# Patient Record
Sex: MALE | Race: WHITE | Employment: FULL TIME | URBAN - NONMETROPOLITAN AREA
[De-identification: names, ages, dates, MRNs, and addresses within clinical notes are randomized per-mention and may not be internally consistent; named-entity substitution may affect disease eponyms.]

---

## 2019-03-15 LAB
ALBUMIN SERPL-MCNC: NORMAL G/DL
ALP BLD-CCNC: NORMAL U/L
ALT SERPL-CCNC: NORMAL U/L
ANION GAP SERPL CALCULATED.3IONS-SCNC: NORMAL MMOL/L
AST SERPL-CCNC: NORMAL U/L
BILIRUB SERPL-MCNC: NORMAL MG/DL
BUN BLDV-MCNC: NORMAL MG/DL
CALCIUM SERPL-MCNC: NORMAL MG/DL
CHLORIDE BLD-SCNC: NORMAL MMOL/L
CHOLESTEROL, TOTAL: NORMAL
CHOLESTEROL/HDL RATIO: NORMAL
CO2: NORMAL
CREAT SERPL-MCNC: NORMAL MG/DL
GFR CALCULATED: NORMAL
GLUCOSE BLD-MCNC: NORMAL MG/DL
HDLC SERPL-MCNC: NORMAL MG/DL
LDL CHOLESTEROL CALCULATED: NORMAL
POTASSIUM SERPL-SCNC: NORMAL MMOL/L
SODIUM BLD-SCNC: NORMAL MMOL/L
TOTAL PROTEIN: NORMAL
TRIGL SERPL-MCNC: NORMAL MG/DL
VLDLC SERPL CALC-MCNC: NORMAL MG/DL

## 2019-06-13 ENCOUNTER — OFFICE VISIT (OUTPATIENT)
Dept: FAMILY MEDICINE CLINIC | Age: 59
End: 2019-06-13
Payer: COMMERCIAL

## 2019-06-13 VITALS
OXYGEN SATURATION: 98 % | HEIGHT: 71 IN | HEART RATE: 62 BPM | SYSTOLIC BLOOD PRESSURE: 128 MMHG | DIASTOLIC BLOOD PRESSURE: 82 MMHG | TEMPERATURE: 97.9 F | WEIGHT: 195 LBS | BODY MASS INDEX: 27.3 KG/M2

## 2019-06-13 DIAGNOSIS — M54.6 ACUTE LEFT-SIDED THORACIC BACK PAIN: Primary | ICD-10-CM

## 2019-06-13 PROCEDURE — 99213 OFFICE O/P EST LOW 20 MIN: CPT | Performed by: INTERNAL MEDICINE

## 2019-06-13 RX ORDER — CHLORAL HYDRATE 500 MG
1000 CAPSULE ORAL DAILY
COMMUNITY

## 2019-06-13 RX ORDER — NAPROXEN 500 MG/1
500 TABLET ORAL 2 TIMES DAILY WITH MEALS
Qty: 20 TABLET | Refills: 0 | Status: SHIPPED
Start: 2019-06-13 | End: 2020-07-30 | Stop reason: ALTCHOICE

## 2019-06-13 RX ORDER — CYCLOBENZAPRINE HCL 10 MG
10 TABLET ORAL NIGHTLY PRN
Qty: 15 TABLET | Refills: 0 | Status: SHIPPED | OUTPATIENT
Start: 2019-06-13 | End: 2019-06-23

## 2019-06-13 ASSESSMENT — PATIENT HEALTH QUESTIONNAIRE - PHQ9
1. LITTLE INTEREST OR PLEASURE IN DOING THINGS: 0
2. FEELING DOWN, DEPRESSED OR HOPELESS: 0
SUM OF ALL RESPONSES TO PHQ QUESTIONS 1-9: 0
SUM OF ALL RESPONSES TO PHQ QUESTIONS 1-9: 0
SUM OF ALL RESPONSES TO PHQ9 QUESTIONS 1 & 2: 0

## 2019-06-13 ASSESSMENT — ENCOUNTER SYMPTOMS
BACK PAIN: 1
ABDOMINAL DISTENTION: 0
SHORTNESS OF BREATH: 0
ABDOMINAL PAIN: 0

## 2019-06-13 NOTE — PROGRESS NOTES
MHYX JOSE DB MARTI      19  Mac Lat : 1960 Sex: male  Age: 60 y.o. Chief Complaint   Patient presents with    Back Pain     Left upper back X7days       HPI: Patient presents today for acute visit complaining of left upper back discomfort for about a week and a half ago states he rolled over in bed \"violently\" something pull going to chiropractic x2 he did some better he has shown slight improvement but still having some pain radiates around to the front. No pain to the arm or leg. Review of Systems   Respiratory: Negative for shortness of breath. Cardiovascular: Positive for chest pain. Left musculoskeletal pain   Gastrointestinal: Negative for abdominal distention and abdominal pain. Musculoskeletal: Positive for back pain. See above     REST OF PERTINENT ROS GONE OVER AND WAS NEGATIVE. Current Outpatient Medications:     Omega-3 Fatty Acids (FISH OIL) 1000 MG CAPS, Take 1,000 mg by mouth daily, Disp: , Rfl:     cyclobenzaprine (FLEXERIL) 10 MG tablet, Take 1 tablet by mouth nightly as needed for Muscle spasms, Disp: 15 tablet, Rfl: 0    naproxen (NAPROSYN) 500 MG tablet, Take 1 tablet by mouth 2 times daily (with meals), Disp: 20 tablet, Rfl: 0  No Known Allergies    History reviewed. No pertinent past medical history. History reviewed. No pertinent surgical history. History reviewed. No pertinent family history.   Social History     Socioeconomic History    Marital status: Unknown     Spouse name: Not on file    Number of children: Not on file    Years of education: Not on file    Highest education level: Not on file   Occupational History    Not on file   Social Needs    Financial resource strain: Not on file    Food insecurity:     Worry: Not on file     Inability: Not on file    Transportation needs:     Medical: Not on file     Non-medical: Not on file   Tobacco Use    Smoking status: Former Smoker     Packs/day: 1.00     Years: 25.00 nightly as needed for Muscle spasms  -     naproxen (NAPROSYN) 500 MG tablet; Take 1 tablet by mouth 2 times daily (with meals)    Plan: Naproxen/cyclobenzaprine. Warned of potential side effects. Take the muscle relaxant at night. Heat for the next 5 to 7 days but exertional activity. No x-ray today. Notify us if not improving. Return keep appt. Seen By:  Jimena León MD      *Document was created using voice recognition software. Note was reviewed however may contain grammatical errors.

## 2019-07-02 ENCOUNTER — TELEPHONE (OUTPATIENT)
Dept: FAMILY MEDICINE CLINIC | Age: 59
End: 2019-07-02

## 2019-07-02 DIAGNOSIS — M54.6 ACUTE THORACIC BACK PAIN, UNSPECIFIED BACK PAIN LATERALITY: Primary | ICD-10-CM

## 2019-07-02 NOTE — TELEPHONE ENCOUNTER
Patient called in stating he seen you a few weeks ago regarding his back. He is not feeling better and requesting xrays of his back?   Thank you

## 2019-11-21 RX ORDER — AMOXICILLIN 500 MG
CAPSULE ORAL
COMMUNITY

## 2019-11-21 RX ORDER — PHENOL 1.4 %
1 AEROSOL, SPRAY (ML) MUCOUS MEMBRANE DAILY
COMMUNITY
End: 2020-07-30 | Stop reason: ALTCHOICE

## 2020-07-30 ENCOUNTER — TELEPHONE (OUTPATIENT)
Dept: FAMILY MEDICINE CLINIC | Age: 60
End: 2020-07-30

## 2020-07-30 ENCOUNTER — OFFICE VISIT (OUTPATIENT)
Dept: FAMILY MEDICINE CLINIC | Age: 60
End: 2020-07-30
Payer: COMMERCIAL

## 2020-07-30 ENCOUNTER — HOSPITAL ENCOUNTER (OUTPATIENT)
Age: 60
Discharge: HOME | End: 2020-08-01
Payer: COMMERCIAL

## 2020-07-30 VITALS
BODY MASS INDEX: 27.44 KG/M2 | DIASTOLIC BLOOD PRESSURE: 82 MMHG | SYSTOLIC BLOOD PRESSURE: 124 MMHG | WEIGHT: 196 LBS | HEART RATE: 53 BPM | OXYGEN SATURATION: 98 % | TEMPERATURE: 97.2 F | HEIGHT: 71 IN

## 2020-07-30 LAB
ALBUMIN SERPL-MCNC: 4.3 G/DL (ref 3.5–5.2)
ALP BLD-CCNC: 80 U/L (ref 40–129)
ALT SERPL-CCNC: 18 U/L (ref 0–40)
ANION GAP SERPL CALCULATED.3IONS-SCNC: 17 MMOL/L (ref 7–16)
AST SERPL-CCNC: 19 U/L (ref 0–39)
BASOPHILS ABSOLUTE: 0.07 E9/L (ref 0–0.2)
BASOPHILS RELATIVE PERCENT: 1.3 % (ref 0–2)
BILIRUB SERPL-MCNC: 0.4 MG/DL (ref 0–1.2)
BILIRUBIN URINE: NEGATIVE
BLOOD, URINE: NEGATIVE
BUN BLDV-MCNC: 17 MG/DL (ref 8–23)
CALCIUM SERPL-MCNC: 9.4 MG/DL (ref 8.6–10.2)
CHLORIDE BLD-SCNC: 103 MMOL/L (ref 98–107)
CHOLESTEROL, TOTAL: 200 MG/DL (ref 0–199)
CLARITY: CLEAR
CO2: 22 MMOL/L (ref 22–29)
COLOR: YELLOW
CREAT SERPL-MCNC: 1 MG/DL (ref 0.7–1.2)
EOSINOPHILS ABSOLUTE: 0.12 E9/L (ref 0.05–0.5)
EOSINOPHILS RELATIVE PERCENT: 2.2 % (ref 0–6)
GFR AFRICAN AMERICAN: >60
GFR NON-AFRICAN AMERICAN: >60 ML/MIN/1.73
GLUCOSE BLD-MCNC: 94 MG/DL (ref 74–99)
GLUCOSE URINE: NEGATIVE MG/DL
HCT VFR BLD CALC: 45.2 % (ref 37–54)
HDLC SERPL-MCNC: 35 MG/DL
HEMOGLOBIN: 14.2 G/DL (ref 12.5–16.5)
IMMATURE GRANULOCYTES #: 0.02 E9/L
IMMATURE GRANULOCYTES %: 0.4 % (ref 0–5)
KETONES, URINE: NEGATIVE MG/DL
LDL CHOLESTEROL CALCULATED: 103 MG/DL (ref 0–99)
LEUKOCYTE ESTERASE, URINE: NEGATIVE
LYMPHOCYTES ABSOLUTE: 1.92 E9/L (ref 1.5–4)
LYMPHOCYTES RELATIVE PERCENT: 34.5 % (ref 20–42)
MCH RBC QN AUTO: 30.5 PG (ref 26–35)
MCHC RBC AUTO-ENTMCNC: 31.4 % (ref 32–34.5)
MCV RBC AUTO: 97 FL (ref 80–99.9)
MONOCYTES ABSOLUTE: 0.63 E9/L (ref 0.1–0.95)
MONOCYTES RELATIVE PERCENT: 11.3 % (ref 2–12)
NEUTROPHILS ABSOLUTE: 2.8 E9/L (ref 1.8–7.3)
NEUTROPHILS RELATIVE PERCENT: 50.3 % (ref 43–80)
NITRITE, URINE: NEGATIVE
PDW BLD-RTO: 13 FL (ref 11.5–15)
PH UA: 6 (ref 5–9)
PLATELET # BLD: 205 E9/L (ref 130–450)
PMV BLD AUTO: 11.8 FL (ref 7–12)
POTASSIUM SERPL-SCNC: 4.1 MMOL/L (ref 3.5–5)
PROSTATE SPECIFIC ANTIGEN: 0.19 NG/ML (ref 0–4)
PROTEIN UA: NEGATIVE MG/DL
RBC # BLD: 4.66 E12/L (ref 3.8–5.8)
SODIUM BLD-SCNC: 142 MMOL/L (ref 132–146)
SPECIFIC GRAVITY UA: >=1.03 (ref 1–1.03)
TOTAL PROTEIN: 6.9 G/DL (ref 6.4–8.3)
TRIGL SERPL-MCNC: 312 MG/DL (ref 0–149)
URIC ACID, SERUM: 6.5 MG/DL (ref 3.4–7)
UROBILINOGEN, URINE: 0.2 E.U./DL
VLDLC SERPL CALC-MCNC: 62 MG/DL
WBC # BLD: 5.6 E9/L (ref 4.5–11.5)

## 2020-07-30 PROCEDURE — 84550 ASSAY OF BLOOD/URIC ACID: CPT

## 2020-07-30 PROCEDURE — 80053 COMPREHEN METABOLIC PANEL: CPT

## 2020-07-30 PROCEDURE — 85025 COMPLETE CBC W/AUTO DIFF WBC: CPT

## 2020-07-30 PROCEDURE — 99396 PREV VISIT EST AGE 40-64: CPT | Performed by: INTERNAL MEDICINE

## 2020-07-30 PROCEDURE — G0103 PSA SCREENING: HCPCS

## 2020-07-30 PROCEDURE — 81003 URINALYSIS AUTO W/O SCOPE: CPT

## 2020-07-30 PROCEDURE — 81003 URINALYSIS AUTO W/O SCOPE: CPT | Performed by: INTERNAL MEDICINE

## 2020-07-30 PROCEDURE — 80061 LIPID PANEL: CPT

## 2020-07-30 PROCEDURE — 36415 COLL VENOUS BLD VENIPUNCTURE: CPT

## 2020-07-30 ASSESSMENT — PATIENT HEALTH QUESTIONNAIRE - PHQ9
SUM OF ALL RESPONSES TO PHQ QUESTIONS 1-9: 0
2. FEELING DOWN, DEPRESSED OR HOPELESS: 0
1. LITTLE INTEREST OR PLEASURE IN DOING THINGS: 0
SUM OF ALL RESPONSES TO PHQ9 QUESTIONS 1 & 2: 0
SUM OF ALL RESPONSES TO PHQ QUESTIONS 1-9: 0

## 2020-07-30 NOTE — PROGRESS NOTES
[email or web address] [facility]     20  Gopi Palencia : 1960 Sex: male  Age: 63 y.o. Chief Complaint   Patient presents with    Annual Exam       HPI    Patient presents today for 1 year follow-up complete preventative visit. In the interim since I have seen him he developed some right hand pain for which he saw orthopedics/Dr. Theodore woodward. He has had injections and is doing some better. He states  wanted a uric acid level and is unsure if he wanted other blood work but did not bring the paper today. I did offer to hold off on labs until he called in but he wants to get labs done right now while he is here. I told him he can call in if he needs additional blood work and we would order it. States he is having no other issues today. Review of Systems     Const: Denies chills, fever and sweats. Eyes: Denies eye symptoms. ENMT: Denies ear symptoms. Reports postnasal drip, but denies other nasal symptoms. Denies  mouth or throat symptoms. CV: Denies chest pain, orthopnea and palpitations. Resp: Denies cough, SOB and wheezing. GI: Denies diarrhea, nausea and vomiting. : Urinary: denies dysuria, frequency and frequent UTI's. Musculo: Reports arthritis./Right hand discomfort-see above  Skin: Denies skin, hair and nail symptoms. Neuro: Denies dizziness, headache, seizures and syncope. Psych: Denies psychiatric symptoms. REST OF PERTINENT ROS GONE OVER AND WAS NEGATIVE. PMH:  Health Maintenance:  Colonoscopy Screening - (12/15/2017)  Colonoscopy - (12/15/2017)  Colonoscopy - (2014)  Prostate Exam - ,,6/15, ,   Psa Test - ,,6/15, ,   Rectal Exam - ,,6/15, ,   Colonoscopy - ,- due 20  Medical Problems:  Hyperlipidemia, Colon Polyps, Diverticulosis  Reviewed and updated. FH:  Father:  Prostate Cancer, Hypertension. Mother:  Non Insulin Dependent Diabetes. Brother 1:  Colon Cancer - . Sister 1:  None.   Sister 2:  Alzheimer's Disease - at age 61. Sister 3:  No Current Problems. Ileana Ache  1960 Page #2  Reviewed, no changes. SH:  . (Marital)  Personal Habits: Cigarette Use: Former Cigarette Smoker. Alcohol: Current Alcohol Use - 6 Pack per  week. Reviewed and updated. Current Outpatient Medications:     Specialty Vitamins Products (COLLAGEN ULTRA) CAPS, Take by mouth, Disp: , Rfl:     Omega-3 Fatty Acids (FISH OIL) 1000 MG CAPS, Take 1,000 mg by mouth daily, Disp: , Rfl:   No Known Allergies    Past Medical History:   Diagnosis Date    Colon polyps     Diverticulosis     Hyperlipidemia      No past surgical history on file.   Family History   Problem Relation Age of Onset    Diabetes Mother     Prostate Cancer Father     High Blood Pressure Father     Alzheimer's Disease Sister     Colon Cancer Brother      Social History     Socioeconomic History    Marital status:      Spouse name: Not on file    Number of children: Not on file    Years of education: Not on file    Highest education level: Not on file   Occupational History    Not on file   Social Needs    Financial resource strain: Not on file    Food insecurity     Worry: Not on file     Inability: Not on file    Transportation needs     Medical: Not on file     Non-medical: Not on file   Tobacco Use    Smoking status: Former Smoker     Packs/day: 1.00     Years: 25.00     Pack years: 25.00     Types: Cigarettes     Last attempt to quit: 2006     Years since quittin.5    Smokeless tobacco: Never Used   Substance and Sexual Activity    Alcohol use: Yes     Comment: 6 pack/week    Drug use: Not on file    Sexual activity: Not on file   Lifestyle    Physical activity     Days per week: Not on file     Minutes per session: Not on file    Stress: Not on file   Relationships    Social connections     Talks on phone: Not on file     Gets together: Not on file     Attends Bahai service: Not on file     Active member of club or organization: Not on file     Attends meetings of clubs or organizations: Not on file     Relationship status: Not on file    Intimate partner violence     Fear of current or ex partner: Not on file     Emotionally abused: Not on file     Physically abused: Not on file     Forced sexual activity: Not on file   Other Topics Concern    Not on file   Social History Narrative    Not on file       Vitals:    07/30/20 0702   BP: 124/82   Pulse: 53   Temp: 97.2 °F (36.2 °C)   TempSrc: Temporal   SpO2: 98%   Weight: 196 lb (88.9 kg)   Height: 5' 11\" (1.803 m)       Physical Exam    Const: Appears well developed and well nourished. No signs of acute distress present. Eyes: EOMI in both eyes. PERRL. ENMT: . (External Ears) Tympanic membranes are intact. External nose WNL. Moistness and  normal color of the nasal mucosae. Nasal septum. (Septum) . (Teeth) Gums appear healthy. Posterior pharynx shows no exudate, irritation or redness. Neck: Supple and symmetric. Palpation reveals no adenopathy. No masses appreciated. Thyroid  exhibits no nodule or thyromegaly. No JVD. Carotids: 2+ and equal bilaterally, without bruits. Resp: No rales, rhonchi or wheezes appreciated over the lungs bilaterally. CV: Rate is regular. Rhythm is regular. S1 is normal. S2 is normal. No gallop or rubs. No heart  murmur appreciated. Extremities: No clubbing, cyanosis or edema. Abdomen: Bowel sounds are normoactive. Palpation reveals softness, with no distension,  organomegaly or tenderness. No abdominal masses palpable. No palpable hepatosplenomegaly. Musculo: Walks with a normal gait. Upper Extremities: Full ROM bilaterally/osteoarthritic changes to  hands Lower Extremities: Full ROM bilaterally. Skin: Dry and warm with no rash. Neuro: Alert and oriented x3. Mood is normal. Affect is normal. Speech is articulate and fluent. Upper Extremities: motor strength is intact.  Normal muscle tone bilaterally. Lower Extremities: motor  strength is intact. Normal muscle tone bilaterally. Sensation intact to light touch. Reflexes: DTR's are  symmetric, intact and 2+ bilaterally. Cranial Nerves: Cranial nerves II-XII intact. Psych: Patient's attitude is cooperative. Patient's affect is appropriate. Judgement is realistic. Insight  is appropriate. Rectal Examination: Sphincter tone good. Stool for occult blood guaiac negative. No rectal masses noted. Prostate not palpably enlarged. Genital examination unremarkable        Assessment and Plan:  Phillip Harmon was seen today for annual exam.    Diagnoses and all orders for this visit:    Routine general medical examination at a health care facility  -     CBC Auto Differential; Future  -     Comprehensive Metabolic Panel; Future  -     Lipid Panel; Future  -     Urinalysis; Future    Pain of right hand  -     URIC ACID; Future    Screening for malignant neoplasm of prostate  -     Psa screening; Future    Plan: I will see him back in 1 year for complete preventative visit. Notify me with problems in the interim. Blood work today including PSA and we will get uric acid level per orthopedic recommendation. Will send copy to them. He is due for colonoscopy this year I asked him to make sure he hears from them before the end of the year. Return in about 1 year (around 7/30/2021). Seen By:  Claudean December, MD      *Document was created using voice recognition software. Note was reviewed however may contain grammatical errors.

## 2020-12-02 ENCOUNTER — TELEPHONE (OUTPATIENT)
Dept: FAMILY MEDICINE CLINIC | Age: 60
End: 2020-12-02

## 2020-12-02 NOTE — TELEPHONE ENCOUNTER
Emelia Dickinson calling to tell you that he is having some drainage with no other symptoms. This started 2 days ago. It is causing a tickle, not a cough, and he has no other symptoms. What do you recommend? He is willing to try something OTC or rx for this?        (Ok to leave message)

## 2020-12-03 ENCOUNTER — OFFICE VISIT (OUTPATIENT)
Dept: PRIMARY CARE CLINIC | Age: 60
End: 2020-12-03
Payer: COMMERCIAL

## 2020-12-03 VITALS
OXYGEN SATURATION: 98 % | HEIGHT: 71 IN | WEIGHT: 196 LBS | SYSTOLIC BLOOD PRESSURE: 122 MMHG | TEMPERATURE: 98.9 F | RESPIRATION RATE: 18 BRPM | BODY MASS INDEX: 27.44 KG/M2 | DIASTOLIC BLOOD PRESSURE: 80 MMHG | HEART RATE: 72 BPM

## 2020-12-03 PROCEDURE — 99213 OFFICE O/P EST LOW 20 MIN: CPT | Performed by: PHYSICIAN ASSISTANT

## 2020-12-03 RX ORDER — AZITHROMYCIN 250 MG/1
250 TABLET, FILM COATED ORAL SEE ADMIN INSTRUCTIONS
Qty: 6 TABLET | Refills: 0 | Status: SHIPPED | OUTPATIENT
Start: 2020-12-03 | End: 2020-12-08

## 2020-12-03 RX ORDER — CHLORPHENIRAMINE MALEATE 4 MG/1
4 TABLET ORAL EVERY 6 HOURS PRN
Qty: 20 TABLET | Refills: 0 | Status: SHIPPED
Start: 2020-12-03 | End: 2021-03-02 | Stop reason: CLARIF

## 2020-12-03 NOTE — TELEPHONE ENCOUNTER
I spoke to Natacha Herrmann who states that he is actually worse today with a cough and headache. I [facility] to [facility] and he is agreeable to being seen there today.

## 2020-12-03 NOTE — PROGRESS NOTES
12/3/20  Ant Graff : 1960 Sex: male  Age 63 y.o. Subjective:  Chief Complaint   Patient presents with    Nasal Congestion     pt took 2 tylenol this am, sx x 3days    Cough    Pharyngitis    Congestion     head congestion and chest hurts when he coughs    Drainage     pt does not want to be tested for covid         HPI:   Ant Graff , 63 y.o. male presents to express care for evaluation of nasal congestion, rhinorrhea, cough, sore throat. The patient has had some increased head congestion drainage. The patient states that he does not want to be checked for COVID-19. The patient does not believe that he has any of the symptoms that are related. The patient states that it is a tickle in his throat and use some over-the-counter medication that minimally helped. The patient still has a foggy feeling in his head. The patient is not having any neck pain, photophobia. No cough. The patient has not had any shortness of breath. No loss of smell or taste. ROS:   Unless otherwise stated in this report the patient's positive and negative responses for review of systems for constitutional, eyes, ENT, cardiovascular, respiratory, gastrointestinal, neurological, , musculoskeletal, and integument systems and related systems to the presenting problem are either stated in the history of present illness or were not pertinent or were negative for the symptoms and/or complaints related to the presenting medical problem. Positives and pertinent negatives as per HPI. All others reviewed and are negative. PMH:     Past Medical History:   Diagnosis Date    Colon polyps     Diverticulosis     Hyperlipidemia        No past surgical history on file.     Family History   Problem Relation Age of Onset    Diabetes Mother     Prostate Cancer Father     High Blood Pressure Father     Alzheimer's Disease Sister     Colon Cancer Brother        Medications:     Current Outpatient Medications:     Specialty Vitamins Products (COLLAGEN ULTRA) CAPS, Take by mouth, Disp: , Rfl:     Omega-3 Fatty Acids (FISH OIL) 1000 MG CAPS, Take 1,000 mg by mouth daily, Disp: , Rfl:     Allergies:   No Known Allergies    Social History:     Social History     Tobacco Use    Smoking status: Former Smoker     Packs/day: 1.00     Years: 25.00     Pack years: 25.00     Types: Cigarettes     Last attempt to quit: 2006     Years since quittin.9    Smokeless tobacco: Never Used   Substance Use Topics    Alcohol use: Yes     Comment: 6 pack/week    Drug use: Not on file       Patient lives at home. Physical Exam:     Vitals:    20 0843   BP: 122/80   Pulse: 72   Resp: 18   Temp: 98.9 °F (37.2 °C)   TempSrc: Oral   SpO2: 98%   Weight: 196 lb (88.9 kg)   Height: 5' 11\" (1.803 m)       Exam:  Physical Exam  Nurse's notes and vital signs reviewed. The patient is not hypoxic. ? General: Alert, no acute distress, patient resting comfortably Patient is not toxic or lethargic. Skin: Warm, intact, no pallor noted. There is no evidence of rash at this time. Head: Normocephalic, atraumatic  Eye: Normal conjunctiva  Ears, Nose, Throat: Right tympanic membrane clear, left tympanic membrane clear. No drainage or discharge noted. No pre- or post-auricular tenderness, erythema, or swelling noted. Mild nasal congestion, rhinorrhea, no epistaxis  Posterior oropharynx shows no erythema, tonsillar hypertrophy, or exudate. the uvula is midline. No trismus or drooling is noted. Moist mucous membranes. Neck: No anterior/posterior lymphadenopathy noted. No erythema, no masses, no fluctuance or induration noted. No meningeal signs. Cardiovascular: Regular Rate and Rhythm  Respiratory: No acute distress, no rhonchi, wheezing or crackles noted. No stridor or retractions are noted.   Neurological: A&O x4, normal speech  Psychiatric: Cooperative         Testing:           Medical Decision Making:     The patient has been seen and evaluated. The vital signs have been reviewed. The patient does not appear to be toxic or lethargic. The patient is adamantly refusing Covid testing. The patient states that he does not feel that he has symptoms related. Tried to rationalize with him that the symptoms have evolved and you do not necessarily have to have fever, shortness of breath, loss of smell or taste there can be other associated signs and symptoms. The patient still adamantly refused. The patient will be started on a azithromycin and chlorphentermine. The patient was educated on the proper dosage of motrin and tylenol and the appropriate intervals of each. The patient is to increase fluid intake over the next several days. The patient is to use OTC decongestant as needed. The patient is to return to express care or go directly to the emergency department should any of the signs or symptoms worsen. The patient is to followup with primary care physician in 2-3 days for repeat evaluation. The patient has no other questions or concerns at this time the patient will be discharged home. Clinical Impression:   Lissette Avelar was seen today for nasal congestion, cough, pharyngitis, congestion and drainage. Diagnoses and all orders for this visit:    Acute non-recurrent sinusitis, unspecified location  -     azithromycin (ZITHROMAX) 250 MG tablet; Take 1 tablet by mouth See Admin Instructions for 5 days 500mg on day 1 followed by 250mg on days 2 - 5    Nasal congestion    Sinus headache    Other orders  -     chlorpheniramine (ALLER-CHLOR) 4 MG tablet; Take 1 tablet by mouth every 6 hours as needed for Allergies        The patient is to call for any concerns or return if any of the signs or symptoms worsen. The patient is to follow-up with PCP in the next 2-3 days for repeat evaluation repeat assessment or go directly to the emergency department.      SIGNATURE: Yoandy Monahan III, PA-C

## 2020-12-10 ENCOUNTER — OFFICE VISIT (OUTPATIENT)
Dept: PRIMARY CARE CLINIC | Age: 60
End: 2020-12-10
Payer: COMMERCIAL

## 2020-12-10 VITALS
OXYGEN SATURATION: 97 % | BODY MASS INDEX: 27.44 KG/M2 | RESPIRATION RATE: 20 BRPM | TEMPERATURE: 101.1 F | HEIGHT: 71 IN | SYSTOLIC BLOOD PRESSURE: 126 MMHG | HEART RATE: 76 BPM | DIASTOLIC BLOOD PRESSURE: 70 MMHG | WEIGHT: 196 LBS

## 2020-12-10 LAB
Lab: NORMAL
QC PASS/FAIL: NORMAL
SARS-COV-2, POC: DETECTED

## 2020-12-10 PROCEDURE — 99213 OFFICE O/P EST LOW 20 MIN: CPT | Performed by: PHYSICIAN ASSISTANT

## 2020-12-10 PROCEDURE — 87426 SARSCOV CORONAVIRUS AG IA: CPT | Performed by: PHYSICIAN ASSISTANT

## 2020-12-10 NOTE — PROGRESS NOTES
12/10/20  Ortiz Rebollar : 1960 Sex: male  Age 63 y.o. Subjective:  Chief Complaint   Patient presents with    Shortness of Breath     chest heavy, pt was here last week, got a z-pac    Nasal Congestion     sx x 4 days    Drainage    Covid Testing         HPI:   Ortiz Rebollar , 63 y.o. male presents to [facility] care for evaluation of shortness of breath, chest heavy, slight cough, nasal congestion, drainage. The patient was seen and evaluated last week. The patient did not want Covid testing at the time. The patient refused. The patient is now here stating that the symptoms seem to be worsening. The patient is also noted to be febrile in triage. The patient has had increased congestion over the last 4 days. He did start taking some probiotics. The patient is not having any significant diarrhea or myalgias. Wife does have some nasal congestion and sinus symptoms at home. ROS:   Unless otherwise stated in this report the patient's positive and negative responses for review of systems for constitutional, eyes, ENT, cardiovascular, respiratory, gastrointestinal, neurological, , musculoskeletal, and integument systems and related systems to the presenting problem are either stated in the history of present illness or were not pertinent or were negative for the symptoms and/or complaints related to the presenting medical problem. Positives and pertinent negatives as per HPI. All others reviewed and are negative. PMH:     Past Medical History:   Diagnosis Date    Colon polyps     Diverticulosis     Hyperlipidemia        No past surgical history on file.     Family History   Problem Relation Age of Onset    Diabetes Mother     Prostate Cancer Father     High Blood Pressure Father     Alzheimer's Disease Sister     Colon Cancer Brother        Medications:     Current Outpatient Medications:     chlorpheniramine (ALLER-CHLOR) 4 MG tablet, Take 1 tablet by mouth every 6 hours as needed for Allergies, Disp: 20 tablet, Rfl: 0    Specialty Vitamins Products (COLLAGEN ULTRA) CAPS, Take by mouth, Disp: , Rfl:     Omega-3 Fatty Acids (FISH OIL) 1000 MG CAPS, Take 1,000 mg by mouth daily, Disp: , Rfl:     Allergies:   No Known Allergies    Social History:     Social History     Tobacco Use    Smoking status: Former Smoker     Packs/day: 1.00     Years: 25.00     Pack years: 25.00     Types: Cigarettes     Last attempt to quit: 2006     Years since quittin.9    Smokeless tobacco: Never Used   Substance Use Topics    Alcohol use: Yes     Comment: 6 pack/week    Drug use: Not on file       Patient lives at home. Physical Exam:     Vitals:    12/10/20 1238   BP: 126/70   Pulse: 76   Resp: 20   Temp: 101.1 °F (38.4 °C)   TempSrc: Oral   SpO2: 97%   Weight: 196 lb (88.9 kg)   Height: 5' 11\" (1.803 m)       Exam:  Physical Exam  Nurse's notes and vital signs reviewed. The patient is not hypoxic. ? General: Alert, no acute distress, patient resting comfortably Patient is not toxic or lethargic. Skin: Warm, intact, no pallor noted. There is no evidence of rash at this time. Head: Normocephalic, atraumatic  Eye: Normal conjunctiva  Ears, Nose, Throat: Right tympanic membrane clear, left tympanic membrane clear. No drainage or discharge noted. No pre- or post-auricular tenderness, erythema, or swelling noted. Nasal congestion, rhinorrhea  Posterior oropharynx shows slight erythema tonsillar hypertrophy, or exudate. the uvula is midline. No trismus or drooling is noted. Moist mucous membranes. Neck: No anterior/posterior lymphadenopathy noted. No erythema, no masses, no fluctuance or induration noted. No meningeal signs. Cardiovascular: Regular Rate and Rhythm  Respiratory: No acute distress, no rhonchi, wheezing or crackles noted. No stridor or retractions are noted.   Neurological: A&O x4, normal speech  Psychiatric: Cooperative         Testing:     Rapid Covid test was detected. Medical Decision Making:     The patient has been seen and evaluated. The vital signs have been reviewed. The patient does not appear to be toxic or lethargic. Rapid test was detected positive. The patient will quarantine isolate. The patient may continue with the decongestant. Otherwise continue to monitor signs and symptoms. His vital signs are all stable here. Does have a fever. Recommended he start Motrin, Tylenol, and continue to hydrate. The patient was educated on the proper dosage of motrin and tylenol and the appropriate intervals of each. The patient is to increase fluid intake over the next several days. The patient is to use OTC decongestant as needed. The patient is to return to express care or go directly to the emergency department should any of the signs or symptoms worsen. The patient is to followup with primary care physician in 2-3 days for repeat evaluation. The patient has no other questions or concerns at this time the patient will be discharged home. Clinical Impression:   Brett Morales was seen today for shortness of breath, nasal congestion, drainage and covid testing. Diagnoses and all orders for this visit:    COVID-19    Fever, unspecified fever cause    Acute non-recurrent sinusitis, unspecified location    Nasal congestion    Other orders  -     POCT COVID-19, Antigen        The patient is to call for any concerns or return if any of the signs or symptoms worsen. The patient is to follow-up with PCP in the next 2-3 days for repeat evaluation repeat assessment or go directly to the emergency department.      SIGNATURE: Henrik Ramírez III, PA-C

## 2021-02-12 ENCOUNTER — OFFICE VISIT (OUTPATIENT)
Dept: FAMILY MEDICINE CLINIC | Age: 61
End: 2021-02-12
Payer: COMMERCIAL

## 2021-02-12 VITALS
OXYGEN SATURATION: 98 % | WEIGHT: 196 LBS | SYSTOLIC BLOOD PRESSURE: 144 MMHG | TEMPERATURE: 96.9 F | BODY MASS INDEX: 27.44 KG/M2 | DIASTOLIC BLOOD PRESSURE: 82 MMHG | HEART RATE: 64 BPM | HEIGHT: 71 IN

## 2021-02-12 DIAGNOSIS — K40.90 NON-RECURRENT UNILATERAL INGUINAL HERNIA WITHOUT OBSTRUCTION OR GANGRENE: ICD-10-CM

## 2021-02-12 PROCEDURE — 99213 OFFICE O/P EST LOW 20 MIN: CPT | Performed by: INTERNAL MEDICINE

## 2021-02-13 NOTE — PROGRESS NOTES
[facility]     21  Jenna Gonsalez : 1960 Sex: male  Age: 63 y.o. Chief Complaint   Patient presents with    Other     lump in groin, right side; just noticed the last few days; not painful       HPI  Patient presents today after noticing a lump in his right groin over the last several days. Patient denies pain with this. Denies any change in bowel habits. He notices no lumps elsewhere. Reviewed last note which was complete. Review of Systems     Const: Denies chills, fever and sweats. CV: Denies chest pain, orthopnea and palpitations. Resp: Denies cough, SOB and wheezing. GI: Denies diarrhea, nausea, constipation and vomiting. : Urinary: denies dysuria, frequency and frequent UTI's. --- Lump to right groin-see above  Musculo: Reports arthritis  Skin: Denies skin, hair and nail symptoms. Neuro: Denies dizziness, headache, seizures and syncope. Psych: Anxiety over the recently discovered lump    REST OF PERTINENT ROS GONE OVER AND WAS NEGATIVE. Current Outpatient Medications:     chlorpheniramine (ALLER-CHLOR) 4 MG tablet, Take 1 tablet by mouth every 6 hours as needed for Allergies, Disp: 20 tablet, Rfl: 0    Specialty Vitamins Products (COLLAGEN ULTRA) CAPS, Take by mouth, Disp: , Rfl:     Omega-3 Fatty Acids (FISH OIL) 1000 MG CAPS, Take 1,000 mg by mouth daily, Disp: , Rfl:   No Known Allergies    Past Medical History:   Diagnosis Date    Colon polyps     Diverticulosis     Hyperlipidemia      No past surgical history on file.   Family History   Problem Relation Age of Onset    Diabetes Mother     Prostate Cancer Father     High Blood Pressure Father     Alzheimer's Disease Sister     Colon Cancer Brother      Social History     Socioeconomic History    Marital status:      Spouse name: Not on file    Number of children: Not on file    Years of education: Not on file    Highest education level: Not on file   Occupational History  Not on file   Social Needs    Financial resource strain: Not on file    Food insecurity     Worry: Not on file     Inability: Not on file    Transportation needs     Medical: Not on file     Non-medical: Not on file   Tobacco Use    Smoking status: Former Smoker     Packs/day: 1.00     Years: 25.00     Pack years: 25.00     Types: Cigarettes     Quit date: 1/1/2006     Years since quitting: 15.1    Smokeless tobacco: Never Used   Substance and Sexual Activity    Alcohol use: Yes     Comment: 6 pack/week    Drug use: Not on file    Sexual activity: Not on file   Lifestyle    Physical activity     Days per week: Not on file     Minutes per session: Not on file    Stress: Not on file   Relationships    Social connections     Talks on phone: Not on file     Gets together: Not on file     Attends Oriental orthodox service: Not on file     Active member of club or organization: Not on file     Attends meetings of clubs or organizations: Not on file     Relationship status: Not on file    Intimate partner violence     Fear of current or ex partner: Not on file     Emotionally abused: Not on file     Physically abused: Not on file     Forced sexual activity: Not on file   Other Topics Concern    Not on file   Social History Narrative    Not on file       Vitals:    02/12/21 1546   BP: (!) 144/82   Pulse: 64   Temp: 96.9 °F (36.1 °C)   TempSrc: Temporal   SpO2: 98%   Weight: 196 lb (88.9 kg)   Height: 5' 11\" (1.803 m)       Physical Exam  Vitals signs and nursing note reviewed. Constitutional:       General: He is not in acute distress. Abdominal:      General: Bowel sounds are normal.      Palpations: Abdomen is soft. Tenderness: There is no abdominal tenderness. Hernia: A hernia is present. Comments: Examination to the groin area he does have a direct right inguinal hernia. It is reducible and nontender. Neurological:      Mental Status: He is alert.    Psychiatric:         Mood and Affect: Mood normal.         Thought Content: Thought content normal.         Judgment: Judgment normal.      Comments: Very anxious appearing about the hernia. Assessment and Plan:  Deanna Winn was seen today for other. Diagnoses and all orders for this visit:    Non-recurrent unilateral inguinal hernia without obstruction or gangrene  -     Mar Lira MD, General Surgery, [address]: I reassured the patient that we would get him set up with surgery for an evaluation and recommendations. We did set him up with Dr. Christal Vera. Return for keep appt. Seen By:  Betzaida Willingham MD      *Document was created using voice recognition software. Note was reviewed however may contain grammatical errors.

## 2021-03-02 ENCOUNTER — OFFICE VISIT (OUTPATIENT)
Dept: SURGERY | Age: 61
End: 2021-03-02
Payer: COMMERCIAL

## 2021-03-02 VITALS
OXYGEN SATURATION: 97 % | DIASTOLIC BLOOD PRESSURE: 78 MMHG | TEMPERATURE: 99.7 F | RESPIRATION RATE: 16 BRPM | HEIGHT: 71 IN | BODY MASS INDEX: 28.08 KG/M2 | SYSTOLIC BLOOD PRESSURE: 131 MMHG | WEIGHT: 200.6 LBS | HEART RATE: 67 BPM

## 2021-03-02 DIAGNOSIS — K40.90 INGUINAL HERNIA, RIGHT: Primary | ICD-10-CM

## 2021-03-02 PROCEDURE — 99203 OFFICE O/P NEW LOW 30 MIN: CPT | Performed by: SURGERY

## 2021-03-02 NOTE — PROGRESS NOTES
[facility] Note    Assessment/Plan:      Diagnosis Orders   1. Inguinal hernia, right      We will plan for right inguinal hernia repair - will contact Dr. Augsutine Lima office to discuss timing with his scopes         Return for Surgery. Chief Complaint   Patient presents with    New Patient     ref by Dr. Joanna Torres for a inguinal hernia. had the hernia for about 3 or 4 weeks. has some pain/ discomfort at times. PCP: Boo Amado MD    HPI: Phoebe Johns is a 63 y.o. male who presents in consultation for right inguinal hernia. He is active and does work out. Also works for [employer] and says he does some heavy lifting at work. Bowels move normally. He did have a colonoscopy 3 years ago with Dr. Sarita Osgood in \[facility]". He is due for another one next month he states. He does have some occasional discomfort and a bulging in his right groin. He says today it is not out. Past Medical History:   Diagnosis Date    Colon polyps     Diverticulosis     Hyperlipidemia        Past Surgical History:   Procedure Laterality Date    COLONOSCOPY      3 years ago       Prior to Admission medications    Medication Sig Start Date End Date Taking?  Authorizing Provider   Specialty Vitamins Products (COLLAGEN ULTRA) CAPS Take by mouth   Yes Historical Provider, MD   Omega-3 Fatty Acids (FISH OIL) 1000 MG CAPS Take 1,000 mg by mouth daily   Yes Historical Provider, MD       No Known Allergies    Social History     Socioeconomic History    Marital status:      Spouse name: None    Number of children: None    Years of education: None    Highest education level: None   Occupational History    None   Social Needs    Financial resource strain: None    Food insecurity     Worry: None     Inability: None    Transportation needs     Medical: None     Non-medical: None   Tobacco Use    Smoking status: Former Smoker     Packs/day: 1.00     Years: 25.00     Pack years: 25.00     Types: Cigarettes     Quit date: 1/1/2006     Years since quitting: 15.1    Smokeless tobacco: Never Used   Substance and Sexual Activity    Alcohol use: Yes     Comment: 6 pack/week    Drug use: Never    Sexual activity: None   Lifestyle    Physical activity     Days per week: None     Minutes per session: None    Stress: None   Relationships    Social connections     Talks on phone: None     Gets together: None     Attends Amish service: None     Active member of club or organization: None     Attends meetings of clubs or organizations: None     Relationship status: None    Intimate partner violence     Fear of current or ex partner: None     Emotionally abused: None     Physically abused: None     Forced sexual activity: None   Other Topics Concern    None   Social History Narrative    None       Family History   Problem Relation Age of Onset    Diabetes Mother     Prostate Cancer Father     High Blood Pressure Father     Alzheimer's Disease Sister     Colon Cancer Brother        Review of Systems   All other systems reviewed and are negative. Objective:  Vitals:    03/02/21 1544   BP: 131/78   Site: Right Upper Arm   Position: Sitting   Cuff Size: Medium Adult   Pulse: 67   Resp: 16   Temp: 99.7 °F (37.6 °C)   TempSrc: Temporal   SpO2: 97%   Weight: 200 lb 9.6 oz (91 kg)   Height: 5' 11\" (1.803 m)          Physical Exam  Constitutional:       General: He is not in acute distress. Appearance: He is not diaphoretic. HENT:      Head: Normocephalic and atraumatic. Eyes:      General:         Right eye: No discharge. Left eye: No discharge. Neck:      Trachea: No tracheal deviation. Cardiovascular:      Rate and Rhythm: Normal rate. Pulmonary:      Effort: Pulmonary effort is normal. No respiratory distress. Abdominal:      General: There is no distension. Palpations: Abdomen is soft. Tenderness: There is no abdominal tenderness.  There is no guarding or

## 2021-03-09 ENCOUNTER — TELEPHONE (OUTPATIENT)
Dept: SURGERY | Age: 61
End: 2021-03-09

## 2021-03-09 NOTE — TELEPHONE ENCOUNTER
Benny Mullins is scheduled for laparoscopic robotic assisted rt inguinal hernia with mesh with Dr Selam Gr on 04-26-21 at [facility] at 7:30 am. Patient was told to arrive at 6:00 am. Patient needs to be NPO after midnight the night before procedure. All surgery instructions were explained to the patient and a surgery letter was also mailed out. MA informed patient that PAT will also be calling to review pre-op instructions and medications. Patient verbalized understanding. MA called Dr Sang Simon office and spoke with Carloz Gill to check and see when patient was able to have surgery, before or after colonoscopy. Carloz Gill called back and stated that Dr Abner Padilla wants patient to have surgery after colonoscopy on 03-18-21.   Electronically signed by Khurram Riley MA on 3/9/2021 at 8:26 AM

## 2021-03-09 NOTE — TELEPHONE ENCOUNTER
Prior Authorization Form:      DEMOGRAPHICS:                     Patient Name:  Kenya Eldridge  Patient :  1960            Insurance:  Payor: Forrestine Ponds / Plan: Forrestine Ponds - [place] PPO / Product Type: *No Product type* /   Insurance ID Number:    Payor/Plan Subscr  Sex Relation Sub. Ins. ID Effective Group Num   1.  178 Linda Phillips 1960 Male Self [id number]                                    [id number]         DIAGNOSIS & PROCEDURE:                       Procedure/Operation: Laparoscopic robotic assisted rt inguinal hernia repair with mesh           CPT Code: 70434    Diagnosis:  Rt inguinal hernia    ICD10 Code: K40.90    Location:  [facility]    Surgeon:  Dr Marley Hall INFORMATION:                          Date: 21    Time: 7:30 am              Anesthesia:  General                                                       Status:  Outpatient        Special Comments:         Electronically signed by Abeba Serna MA on 3/9/2021 at 8:26 AM

## 2021-03-10 ENCOUNTER — TELEPHONE (OUTPATIENT)
Dept: SURGERY | Age: 61
End: 2021-03-10

## 2021-03-10 NOTE — TELEPHONE ENCOUNTER
Patient called and stated that he cancelled his colonoscopy until August with Dr Aleksey Ramirez because he wanted to have his hernia surgery. MA informe d him that both Dr Aleksey Ramirez and Dr Ayo Brand wants him to have the colonoscopy prior to having his hernia surgery. MA spoke with Pam Elliott at Dr Pretty Hess office and they put patient back on the schedule for his colonoscopy on 04-16-21 and he will keep his appointment on 04-26-21 for his hernia repair. MA spoke with patient and he is in agreement with this.   Electronically signed by Carie Jenkins MA on 3/10/2021 at 2:39 PM

## 2021-04-21 ENCOUNTER — HOSPITAL ENCOUNTER (OUTPATIENT)
Age: 61
Discharge: HOME | End: 2021-04-23
Payer: COMMERCIAL

## 2021-04-21 DIAGNOSIS — Z01.818 PREOP TESTING: ICD-10-CM

## 2021-04-21 PROCEDURE — U0003 INFECTIOUS AGENT DETECTION BY NUCLEIC ACID (DNA OR RNA); SEVERE ACUTE RESPIRATORY SYNDROME CORONAVIRUS 2 (SARS-COV-2) (CORONAVIRUS DISEASE [COVID-19]), AMPLIFIED PROBE TECHNIQUE, MAKING USE OF HIGH THROUGHPUT TECHNOLOGIES AS DESCRIBED BY CMS-2020-01-R: HCPCS

## 2021-04-21 NOTE — PROGRESS NOTES
Have you been tested for COVID  Yes           Have you been told you were positive for COVID Yes  Have you had any known exposure to someone that is positive for COVID No  Do you have a cough                   No              Do you have shortness of breath No                 Do you have a sore throat            No                Are you having chills                    No                Are you having muscle aches. No                    Please come to the hospital wearing a mask and have your significant other wear a mask as well. Both of you should check your temperature before leaving to come here,  if it is 100 or higher please call [phone] for instruction.

## 2021-04-21 NOTE — PROGRESS NOTES
[facility] PRE-ADMISSION TESTING INSTRUCTIONS    The Preadmission Testing patient is instructed accordingly using the following criteria (check applicable):    ARRIVAL INSTRUCTIONS:  [x] Parking the day of Surgery is located in the Main Entrance lot. Upon entering the door, make an immediate right to the surgery reception desk    [x] Bring photo ID and insurance card    [] Bring in a copy of Living will or Durable Power of  papers. [x] Please be sure to arrange transportation to and from the hospital    [x] Please arrange for someone to be with you the remainder of the day due to having anesthesia      GENERAL INSTRUCTIONS:    [x] Nothing by mouth after midnight, including gum, candy, mints or water    [x] You may brush your teeth, but do not swallow any water    [] Take medications as instructed with 1-2 oz of water    [x] Stop herbal supplements and vitamins 5 days prior to procedure    [x] Follow preop dosing of blood thinners per physician instructions    [] Do not take insulin or oral diabetic medications    [] If diabetic and have low blood sugar or feel symptomatic, take 1-2oz apple juice or glucose tablets    [] Bring inhalers day of surgery    [] Bring C-PAP/ Bi-Pap day of surgery    [] Bring urine specimen day of surgery    [x] Antibacterial Soap shower or bath AM of Surgery, no lotion, powders or creams to surgical site    [] Follow bowel prep as instructed per surgeon    [] No tobacco products within 24 hours of surgery     [x] No alcohol or illegal drug use within 24 hours of surgery.     [x] Jewelry, body piercing's, eyeglasses, contact lenses and dentures are not permitted into surgery (bring cases)      [] Please do not wear any nail polish or make up on the day of surgery    [x] If not already done, you can expect a call from registration    [x] If surgeon requests a time change you will be notified the day prior to surgery    [] If you receive a survey after surgery we would greatly appreciate your comments    [] Parent/guardian of a minor must accompany their child and remain on the premises  the entire time they are under our care     [] Pediatric patients may bring favorite toy, blanket or comfort item with them    [] A caregiver or family member must remain with the patient during their stay if they are mentally handicapped, have dementia, disoriented or unable to use a call light or would be a safety concern if left unattended    [x] Please notify surgeon if you develop any illness between now and time of surgery (cold, cough, sore throat, fever, nausea, vomiting) or any signs of infections  including skin, wounds, and dental.    [] Other instructions    EDUCATIONAL MATERIALS PROVIDED:    [] PAT Preoperative Education Packet/Booklet     [] Medication List    [] Fluoroscopy Information Pamphlet    [] Transfusion bracelet applied with instructions    [] Joint replacement video reviewed    [] Shower with antibacterial soap and use CHG wipes provided the evening before surgery as instructed

## 2021-04-22 ENCOUNTER — NURSE ONLY (OUTPATIENT)
Dept: FAMILY MEDICINE CLINIC | Age: 61
End: 2021-04-22
Payer: COMMERCIAL

## 2021-04-22 DIAGNOSIS — Z01.818 PRE-OP TESTING: Primary | ICD-10-CM

## 2021-04-22 LAB
SARS-COV-2: NOT DETECTED
SOURCE: NORMAL

## 2021-04-22 PROCEDURE — 93000 ELECTROCARDIOGRAM COMPLETE: CPT | Performed by: INTERNAL MEDICINE

## 2021-04-25 ENCOUNTER — ANESTHESIA EVENT (OUTPATIENT)
Dept: OPERATING ROOM | Age: 61
End: 2021-04-25
Payer: COMMERCIAL

## 2021-04-25 NOTE — ANESTHESIA PRE PROCEDURE
Department of Anesthesiology  Preprocedure Note       Name:  Ghislaine Price   Age:  63 y.o.  :  1960                                          MRN:  [id number]         Date:  2021      Surgeon: Dipti Shahid):  Maggie Coyne MD    Procedure: Procedure(s):  LAPAROSCOPIC ROBOTIC ASSISTED RIGHT INGUINAL HERNIA REPAIR WITH MESH    Medications prior to admission:   Prior to Admission medications    Medication Sig Start Date End Date Taking? Authorizing Provider   Multiple Vitamins-Minerals (PRESERVISION AREDS PO) Take by mouth   Yes Historical Provider, MD   Specialty Vitamins Products (COLLAGEN ULTRA) CAPS Take by mouth   Yes Historical Provider, MD   Omega-3 Fatty Acids (FISH OIL) 1000 MG CAPS Take 1,000 mg by mouth daily   Yes Historical Provider, MD       Current medications:    Current Facility-Administered Medications   Medication Dose Route Frequency Provider Last Rate Last Admin    sodium chloride flush 0.9 % injection 10 mL  10 mL Intravenous 2 times per day Maggie Coyne MD        sodium chloride flush 0.9 % injection 10 mL  10 mL Intravenous PRN Maggie Coyne MD        0.9 % sodium chloride infusion  25 mL Intravenous PRN Maggie Coyne MD        ceFAZolin (ANCEF) 2000 mg in sterile water 20 mL IV syringe  2,000 mg Intravenous On Call to [address] [place] [address] Street, MD           Allergies:  No Known Allergies    Problem List:  There is no problem list on file for this patient. Past Medical History:        Diagnosis Date    Colon polyps     COVID-19 2020    Diverticulosis     Hyperlipidemia        Past Surgical History:        Procedure Laterality Date    COLONOSCOPY      3 years ago       Social History:    Social History     Tobacco Use    Smoking status: Former Smoker     Packs/day: 1.00     Years: 25.00     Pack years: 25.00     Types: Cigarettes     Quit date: 2006     Years since quitting: 15.3    Smokeless tobacco: Never Used   Substance Use Topics    Alcohol use:  Yes Comment: 6 pack/week                                Counseling given: Not Answered      Vital Signs (Current):   Vitals:    04/21/21 0936 04/26/21 0643   BP:  (!) 145/76   Pulse:  57   Resp:  18   TempSrc:  Temporal   SpO2:  95%   Weight: 191 lb (86.6 kg) 191 lb (86.6 kg)   Height: 5' 11\" (1.803 m) 5' 11\" (1.803 m)                                              BP Readings from Last 3 Encounters:   04/26/21 (!) 145/76   03/02/21 131/78   02/12/21 (!) 144/82       NPO Status: Time of last liquid consumption: 2100                        Time of last solid consumption: 2000                        Date of last liquid consumption: 04/25/21                        Date of last solid food consumption: 04/25/21    BMI:   Wt Readings from Last 3 Encounters:   04/26/21 191 lb (86.6 kg)   03/02/21 200 lb 9.6 oz (91 kg)   02/12/21 196 lb (88.9 kg)     Body mass index is 26.64 kg/m². CBC:   Lab Results   Component Value Date    WBC 5.6 07/30/2020    RBC 4.66 07/30/2020    HGB 14.2 07/30/2020    HCT 45.2 07/30/2020    MCV 97.0 07/30/2020    RDW 13.0 07/30/2020     07/30/2020       CMP:   Lab Results   Component Value Date     07/30/2020    K 4.1 07/30/2020     07/30/2020    CO2 22 07/30/2020    BUN 17 07/30/2020    CREATININE 1.0 07/30/2020    GFRAA >60 07/30/2020    LABGLOM >60 07/30/2020    GLUCOSE 94 07/30/2020    PROT 6.9 07/30/2020    CALCIUM 9.4 07/30/2020    BILITOT 0.4 07/30/2020    ALKPHOS 80 07/30/2020    AST 19 07/30/2020    ALT 18 07/30/2020       POC Tests: No results for input(s): POCGLU, POCNA, POCK, POCCL, POCBUN, POCHEMO, POCHCT in the last 72 hours.     Coags: No results found for: PROTIME, INR, APTT    HCG (If Applicable): No results found for: PREGTESTUR, PREGSERUM, HCG, HCGQUANT     ABGs: No results found for: PHART, PO2ART, KZY0BQM, FHX8NJE, BEART, D4RLVSXY     Type & Screen (If Applicable):  No results found for: LABABO, LABRH    Drug/Infectious Status (If Applicable):  No results found for: HIV, HEPCAB    COVID-19 Screening (If Applicable):   Lab Results   Component Value Date    COVID19 Not Detected 04/21/2021           Anesthesia Evaluation  Patient summary reviewed no history of anesthetic complications:   Airway: Mallampati: III  TM distance: <3 FB     Mouth opening: < 3 FB Dental:          Pulmonary: breath sounds clear to auscultation                            ROS comment: Quit smoking in 2006   Cardiovascular:    (+) hyperlipidemia        Rhythm: regular  Rate: normal                    Neuro/Psych:   Negative Neuro/Psych ROS              GI/Hepatic/Renal:            ROS comment: H/o colon polyps. Endo/Other: Negative Endo/Other ROS                    Abdominal:           Vascular: negative vascular ROS. Anesthesia Plan      general     ASA 2       Induction: intravenous. Anesthetic plan and risks discussed with patient. Plan discussed with CRNA.                   Leela Mcgee MD   4/26/2021

## 2021-04-26 ENCOUNTER — ANESTHESIA (OUTPATIENT)
Dept: OPERATING ROOM | Age: 61
End: 2021-04-26
Payer: COMMERCIAL

## 2021-04-26 ENCOUNTER — HOSPITAL ENCOUNTER (OUTPATIENT)
Age: 61
Setting detail: OUTPATIENT SURGERY
Discharge: HOME | End: 2021-04-26
Attending: SURGERY | Admitting: SURGERY
Payer: COMMERCIAL

## 2021-04-26 VITALS
SYSTOLIC BLOOD PRESSURE: 123 MMHG | DIASTOLIC BLOOD PRESSURE: 62 MMHG | HEIGHT: 71 IN | TEMPERATURE: 98 F | BODY MASS INDEX: 26.74 KG/M2 | RESPIRATION RATE: 18 BRPM | HEART RATE: 65 BPM | WEIGHT: 191 LBS | OXYGEN SATURATION: 95 %

## 2021-04-26 VITALS — TEMPERATURE: 94.8 F | SYSTOLIC BLOOD PRESSURE: 117 MMHG | DIASTOLIC BLOOD PRESSURE: 72 MMHG | OXYGEN SATURATION: 96 %

## 2021-04-26 DIAGNOSIS — Z01.818 PREOP TESTING: Primary | ICD-10-CM

## 2021-04-26 DIAGNOSIS — G89.18 POST-OP PAIN: ICD-10-CM

## 2021-04-26 PROCEDURE — 6360000002 HC RX W HCPCS: Performed by: NURSE ANESTHETIST, CERTIFIED REGISTERED

## 2021-04-26 PROCEDURE — 3700000001 HC ADD 15 MINUTES (ANESTHESIA): Performed by: SURGERY

## 2021-04-26 PROCEDURE — 6360000002 HC RX W HCPCS: Performed by: ANESTHESIOLOGY

## 2021-04-26 PROCEDURE — 3700000000 HC ANESTHESIA ATTENDED CARE: Performed by: SURGERY

## 2021-04-26 PROCEDURE — 49650 LAP ING HERNIA REPAIR INIT: CPT | Performed by: SURGERY

## 2021-04-26 PROCEDURE — 3600000009 HC SURGERY ROBOT BASE: Performed by: SURGERY

## 2021-04-26 PROCEDURE — 2500000003 HC RX 250 WO HCPCS: Performed by: SURGERY

## 2021-04-26 PROCEDURE — 3600000019 HC SURGERY ROBOT ADDTL 15MIN: Performed by: SURGERY

## 2021-04-26 PROCEDURE — 7100000001 HC PACU RECOVERY - ADDTL 15 MIN: Performed by: SURGERY

## 2021-04-26 PROCEDURE — 7100000000 HC PACU RECOVERY - FIRST 15 MIN: Performed by: SURGERY

## 2021-04-26 PROCEDURE — 2580000003 HC RX 258: Performed by: NURSE ANESTHETIST, CERTIFIED REGISTERED

## 2021-04-26 PROCEDURE — 7100000010 HC PHASE II RECOVERY - FIRST 15 MIN: Performed by: SURGERY

## 2021-04-26 PROCEDURE — S2900 ROBOTIC SURGICAL SYSTEM: HCPCS | Performed by: SURGERY

## 2021-04-26 PROCEDURE — 6360000002 HC RX W HCPCS: Performed by: SURGERY

## 2021-04-26 PROCEDURE — 2500000003 HC RX 250 WO HCPCS: Performed by: NURSE ANESTHETIST, CERTIFIED REGISTERED

## 2021-04-26 PROCEDURE — 2709999900 HC NON-CHARGEABLE SUPPLY: Performed by: SURGERY

## 2021-04-26 PROCEDURE — C1781 MESH (IMPLANTABLE): HCPCS | Performed by: SURGERY

## 2021-04-26 PROCEDURE — 7100000011 HC PHASE II RECOVERY - ADDTL 15 MIN: Performed by: SURGERY

## 2021-04-26 DEVICE — MESH HERN W10XL15CM POLY POLYLACTIC ACID 70% CLLGN 30% GLYC: Type: IMPLANTABLE DEVICE | Site: GROIN | Status: FUNCTIONAL

## 2021-04-26 RX ORDER — BUPIVACAINE HYDROCHLORIDE AND EPINEPHRINE 2.5; 5 MG/ML; UG/ML
INJECTION, SOLUTION EPIDURAL; INFILTRATION; INTRACAUDAL; PERINEURAL PRN
Status: DISCONTINUED | OUTPATIENT
Start: 2021-04-26 | End: 2021-04-26 | Stop reason: ALTCHOICE

## 2021-04-26 RX ORDER — MIDAZOLAM HYDROCHLORIDE 1 MG/ML
INJECTION INTRAMUSCULAR; INTRAVENOUS PRN
Status: DISCONTINUED | OUTPATIENT
Start: 2021-04-26 | End: 2021-04-26 | Stop reason: SDUPTHER

## 2021-04-26 RX ORDER — ONDANSETRON 2 MG/ML
INJECTION INTRAMUSCULAR; INTRAVENOUS PRN
Status: DISCONTINUED | OUTPATIENT
Start: 2021-04-26 | End: 2021-04-26 | Stop reason: SDUPTHER

## 2021-04-26 RX ORDER — FENTANYL CITRATE 50 UG/ML
25 INJECTION, SOLUTION INTRAMUSCULAR; INTRAVENOUS EVERY 5 MIN PRN
Status: DISCONTINUED | OUTPATIENT
Start: 2021-04-26 | End: 2021-04-26 | Stop reason: HOSPADM

## 2021-04-26 RX ORDER — FENTANYL CITRATE 50 UG/ML
50 INJECTION, SOLUTION INTRAMUSCULAR; INTRAVENOUS EVERY 5 MIN PRN
Status: DISCONTINUED | OUTPATIENT
Start: 2021-04-26 | End: 2021-04-26 | Stop reason: HOSPADM

## 2021-04-26 RX ORDER — PROPOFOL 10 MG/ML
INJECTION, EMULSION INTRAVENOUS PRN
Status: DISCONTINUED | OUTPATIENT
Start: 2021-04-26 | End: 2021-04-26 | Stop reason: SDUPTHER

## 2021-04-26 RX ORDER — OXYCODONE HYDROCHLORIDE AND ACETAMINOPHEN 5; 325 MG/1; MG/1
1 TABLET ORAL EVERY 6 HOURS PRN
Qty: 28 TABLET | Refills: 0 | Status: SHIPPED | OUTPATIENT
Start: 2021-04-26 | End: 2021-05-03

## 2021-04-26 RX ORDER — SODIUM CHLORIDE 0.9 % (FLUSH) 0.9 %
10 SYRINGE (ML) INJECTION PRN
Status: DISCONTINUED | OUTPATIENT
Start: 2021-04-26 | End: 2021-04-26 | Stop reason: HOSPADM

## 2021-04-26 RX ORDER — SODIUM CHLORIDE 9 MG/ML
INJECTION, SOLUTION INTRAVENOUS CONTINUOUS PRN
Status: DISCONTINUED | OUTPATIENT
Start: 2021-04-26 | End: 2021-04-26 | Stop reason: SDUPTHER

## 2021-04-26 RX ORDER — MEPERIDINE HYDROCHLORIDE 25 MG/ML
12.5 INJECTION INTRAMUSCULAR; INTRAVENOUS; SUBCUTANEOUS EVERY 5 MIN PRN
Status: DISCONTINUED | OUTPATIENT
Start: 2021-04-26 | End: 2021-04-26 | Stop reason: HOSPADM

## 2021-04-26 RX ORDER — SODIUM CHLORIDE 9 MG/ML
25 INJECTION, SOLUTION INTRAVENOUS PRN
Status: DISCONTINUED | OUTPATIENT
Start: 2021-04-26 | End: 2021-04-26 | Stop reason: HOSPADM

## 2021-04-26 RX ORDER — PROMETHAZINE HYDROCHLORIDE 25 MG/ML
6.25 INJECTION, SOLUTION INTRAMUSCULAR; INTRAVENOUS
Status: DISCONTINUED | OUTPATIENT
Start: 2021-04-26 | End: 2021-04-26 | Stop reason: HOSPADM

## 2021-04-26 RX ORDER — SUCCINYLCHOLINE CHLORIDE 20 MG/ML
INJECTION INTRAMUSCULAR; INTRAVENOUS PRN
Status: DISCONTINUED | OUTPATIENT
Start: 2021-04-26 | End: 2021-04-26 | Stop reason: SDUPTHER

## 2021-04-26 RX ORDER — ROCURONIUM BROMIDE 10 MG/ML
INJECTION, SOLUTION INTRAVENOUS PRN
Status: DISCONTINUED | OUTPATIENT
Start: 2021-04-26 | End: 2021-04-26 | Stop reason: SDUPTHER

## 2021-04-26 RX ORDER — DIPHENHYDRAMINE HYDROCHLORIDE 50 MG/ML
12.5 INJECTION INTRAMUSCULAR; INTRAVENOUS
Status: DISCONTINUED | OUTPATIENT
Start: 2021-04-26 | End: 2021-04-26 | Stop reason: HOSPADM

## 2021-04-26 RX ORDER — FENTANYL CITRATE 50 UG/ML
INJECTION, SOLUTION INTRAMUSCULAR; INTRAVENOUS PRN
Status: DISCONTINUED | OUTPATIENT
Start: 2021-04-26 | End: 2021-04-26 | Stop reason: SDUPTHER

## 2021-04-26 RX ORDER — OXYCODONE HYDROCHLORIDE AND ACETAMINOPHEN 5; 325 MG/1; MG/1
1 TABLET ORAL
Status: DISCONTINUED | OUTPATIENT
Start: 2021-04-26 | End: 2021-04-26 | Stop reason: HOSPADM

## 2021-04-26 RX ORDER — SODIUM CHLORIDE 0.9 % (FLUSH) 0.9 %
10 SYRINGE (ML) INJECTION EVERY 12 HOURS SCHEDULED
Status: DISCONTINUED | OUTPATIENT
Start: 2021-04-26 | End: 2021-04-26 | Stop reason: HOSPADM

## 2021-04-26 RX ORDER — LIDOCAINE HYDROCHLORIDE 20 MG/ML
INJECTION, SOLUTION EPIDURAL; INFILTRATION; INTRACAUDAL; PERINEURAL PRN
Status: DISCONTINUED | OUTPATIENT
Start: 2021-04-26 | End: 2021-04-26 | Stop reason: SDUPTHER

## 2021-04-26 RX ADMIN — Medication 2000 MG: at 07:19

## 2021-04-26 RX ADMIN — LIDOCAINE HYDROCHLORIDE 80 MG: 20 INJECTION, SOLUTION EPIDURAL; INFILTRATION; INTRACAUDAL; PERINEURAL at 07:24

## 2021-04-26 RX ADMIN — SODIUM CHLORIDE: 9 INJECTION, SOLUTION INTRAVENOUS at 07:18

## 2021-04-26 RX ADMIN — PROPOFOL 180 MG: 10 INJECTION, EMULSION INTRAVENOUS at 07:24

## 2021-04-26 RX ADMIN — ROCURONIUM BROMIDE 40 MG: 10 INJECTION, SOLUTION INTRAVENOUS at 07:31

## 2021-04-26 RX ADMIN — FENTANYL CITRATE 50 MCG: 50 INJECTION, SOLUTION INTRAMUSCULAR; INTRAVENOUS at 07:31

## 2021-04-26 RX ADMIN — ROCURONIUM BROMIDE 5 MG: 10 INJECTION, SOLUTION INTRAVENOUS at 08:19

## 2021-04-26 RX ADMIN — FENTANYL CITRATE 50 MCG: 50 INJECTION, SOLUTION INTRAMUSCULAR; INTRAVENOUS at 09:37

## 2021-04-26 RX ADMIN — ONDANSETRON 4 MG: 2 INJECTION INTRAMUSCULAR; INTRAVENOUS at 08:01

## 2021-04-26 RX ADMIN — FENTANYL CITRATE 50 MCG: 50 INJECTION, SOLUTION INTRAMUSCULAR; INTRAVENOUS at 07:24

## 2021-04-26 RX ADMIN — FENTANYL CITRATE 50 MCG: 50 INJECTION, SOLUTION INTRAMUSCULAR; INTRAVENOUS at 08:51

## 2021-04-26 RX ADMIN — SUCCINYLCHOLINE CHLORIDE 140 MG: 20 INJECTION, SOLUTION INTRAMUSCULAR; INTRAVENOUS at 07:24

## 2021-04-26 RX ADMIN — MIDAZOLAM 2 MG: 1 INJECTION INTRAMUSCULAR; INTRAVENOUS at 07:18

## 2021-04-26 RX ADMIN — FENTANYL CITRATE 50 MCG: 50 INJECTION, SOLUTION INTRAMUSCULAR; INTRAVENOUS at 07:53

## 2021-04-26 RX ADMIN — SUGAMMADEX 173 MG: 100 INJECTION, SOLUTION INTRAVENOUS at 08:39

## 2021-04-26 RX ADMIN — FENTANYL CITRATE 50 MCG: 50 INJECTION, SOLUTION INTRAMUSCULAR; INTRAVENOUS at 09:42

## 2021-04-26 ASSESSMENT — PULMONARY FUNCTION TESTS
PIF_VALUE: 19
PIF_VALUE: 28
PIF_VALUE: 17
PIF_VALUE: 26
PIF_VALUE: 15
PIF_VALUE: 28
PIF_VALUE: 21
PIF_VALUE: 3
PIF_VALUE: 28
PIF_VALUE: 27
PIF_VALUE: 19
PIF_VALUE: 28
PIF_VALUE: 15
PIF_VALUE: 27
PIF_VALUE: 23
PIF_VALUE: 28
PIF_VALUE: 0
PIF_VALUE: 27
PIF_VALUE: 28
PIF_VALUE: 28
PIF_VALUE: 25
PIF_VALUE: 27
PIF_VALUE: 28
PIF_VALUE: 29
PIF_VALUE: 28
PIF_VALUE: 26
PIF_VALUE: 26
PIF_VALUE: 17
PIF_VALUE: 19
PIF_VALUE: 17
PIF_VALUE: 28
PIF_VALUE: 20
PIF_VALUE: 27
PIF_VALUE: 26
PIF_VALUE: 1
PIF_VALUE: 10
PIF_VALUE: 15
PIF_VALUE: 28
PIF_VALUE: 28
PIF_VALUE: 10
PIF_VALUE: 28
PIF_VALUE: 16
PIF_VALUE: 14
PIF_VALUE: 28
PIF_VALUE: 28
PIF_VALUE: 18
PIF_VALUE: 28
PIF_VALUE: 26
PIF_VALUE: 25
PIF_VALUE: 23
PIF_VALUE: 28

## 2021-04-26 ASSESSMENT — PAIN SCALES - GENERAL: PAINLEVEL_OUTOF10: 3

## 2021-04-26 ASSESSMENT — PAIN DESCRIPTION - LOCATION: LOCATION: ABDOMEN

## 2021-04-26 NOTE — OP NOTE
Operative Note    Salvador Martin  YOB: 1960  [id number]    Pre-operative Diagnosis: RIGHT INGUINAL HERNIA    Post-operative Diagnosis: RIGHT INGUINAL HERNIA    Procedure(s): HERNIA INGUINAL REPAIR LAPAROSCOPIC ROBOTIC XI  Implant Name Type Inv. Item Serial No.  Lot No. LRB No. Used Action   MESH MARLIN [id number] POLY POLYLACTIC ACID 70% CLLGN 30% GLYC  MESH MARLIN [id number] POLY POLYLACTIC ACID 70% CLLGN 30% [id number] [facility] [id number] Right 1 Implanted       Surgeon: Cathren Meigs, MD    Staff:  Relief Scrub: Christian Ryan LPN  Scrub Person First: Jason Degroot    Anesthesia:   General    Estimated Blood Loss: less than 50     Complications:   None    Specimens: * No specimens in log *    Findings: Right Indirect Inguinal Hernia    Indications: Patient is a 63 y.o. male who was diagnosed with Right Inguinal Hernia. Risks/Benefits/Alternatives were discussed with the patient, including bleeding, infection, Recurrence. The patient agreed to undergo the proceed and informed consent was obtained. Procedure: After informed consent, the patient was brought to the operating room and placed Supine. General anesthesia was then induced which the patient tolerated well. Time out was performed to identify the correct patient and procedure. They received appropriate perioperative antibiotics. The abdomen and genitalia were prepped and draped in the usual sterile fashion. Pneumoperitoneum was created with Veress in the left upper quadrant at the site of one of the proposed robotic trocars and 8mm robotic optiview port used in this location for abdominal entry. In the midline between umbilicus and epigastrium, incision was made and 8 mm port was placed into the abdomen. Additional 8 millimeter port was placed under direct visualization in the RUQ. The robot was then brought up to the table and docked.  Camera and working instruments are then inserted and directed down to the Right lower quadrant. The defect was Lateral to the epigastric vessels. The peritoneum was then incised at the level ASIS from lateral to medial. Our preperitoneal working space was then created with blunt dissection as well as scissor cautery. This was carried down to the level of the pubic tubercle medially and to the ASIS laterally. Teodoro's ligament was identified and cleared off medially. The hernia sac and cord structures were identified. Hernia sac was dissected free from the cord structures. Once the hernia sac was circumferentially dissected off the cord structures without any tenting, we were ready to place our mesh. Hemostasis was ensured with electrocautery. The Progrip mesh was then inserted and flattened along the myopectineal orifice. We had good coverage of the entire area with peritoneum dissected away from the inferior edge of the mesh. 2-0 V lock suture was then used to sew the peritoneum back together in place. Pneumoperitoneum was released. Ports were removed and the skin incisions were closed with 4-0 Monocryl suture. Skin glue was applied. Patient tolerated procedure well without any complications. Counts were correct at the end of the case. I was present and scrubbed for the procedure.     Saray Corona MD  04/26/21  8:40 AM

## 2021-04-26 NOTE — PROGRESS NOTES
CLINICAL PHARMACY NOTE: MEDS TO [address] Select Patient?: No  Total # of Prescriptions Filled: 1   The following medications were delivered to the patient:  · Percocet 5-325 mg  Total # of Interventions Completed: 7  Time Spent (min): 45    Additional Documentation:

## 2021-04-26 NOTE — H&P
GENERAL SURGERY  HISTORY AND PHYSICAL  4/26/2021    No chief complaint on file. PINKY Smith is a 63 y.o. male who presents for evaluation of R inguinal hernia. He he had this for a couple of months now but most recently this has been bothering him on and off. He is active and does heavy lifting at work. He states his hernia does bulge out but currently does not. He denies issues with bowel function. Past Medical History:   Diagnosis Date    Colon polyps     COVID-19 12/2020    Diverticulosis     Hyperlipidemia        Past Surgical History:   Procedure Laterality Date    COLONOSCOPY      3 years ago       Prior to Admission medications    Medication Sig Start Date End Date Taking?  Authorizing Provider   Multiple Vitamins-Minerals (PRESERVISION AREDS PO) Take by mouth   Yes Historical Provider, MD   Specialty Vitamins Products (COLLAGEN ULTRA) CAPS Take by mouth   Yes Historical Provider, MD   Omega-3 Fatty Acids (FISH OIL) 1000 MG CAPS Take 1,000 mg by mouth daily   Yes Historical Provider, MD       No Known Allergies    Family History   Problem Relation Age of Onset    Diabetes Mother     Prostate Cancer Father     High Blood Pressure Father     Alzheimer's Disease Sister     Colon Cancer Brother        Social History     Tobacco Use    Smoking status: Former Smoker     Packs/day: 1.00     Years: 25.00     Pack years: 25.00     Types: Cigarettes     Quit date: 1/1/2006     Years since quitting: 15.3    Smokeless tobacco: Never Used   Substance Use Topics    Alcohol use: Yes     Comment: 6 pack/week    Drug use: Never         Review of Systems - History obtained from patient   General ROS: negative obtundation, AMS  ENT ROS: negative rhinorrhea, epistaxis  Allergy and Immunology ROS: negative itchy/watery eyes or nasal congestion  Hematological and Lymphatic ROS: negative spontaneous bleeding or bruising  Endocrine ROS: negative  lethargy, mood swings, palpitations or polydipsia/polyuria  Respiratory ROS: negative sputum changes, stridor, tachypnea or wheezing  Cardiovascular ROS: negative for - loss of consciousness, murmur or orthopnea  Gastrointestinal ROS: right inguinal hernia   Genito-Urinary ROS: negative for -  genital discharge or hematuria  Musculoskeletal ROS: negative for - focal weakness, gangrene  Psych/Neuro ROS: negative for - visual or auditory hallucinations, suicidal ideation      PHYSICAL EXAM:    Vitals:    04/26/21 0643   BP: (!) 145/76   Pulse: 57   Resp: 18   SpO2: 95%       General appearance:  NAD, appears stated age  Head: NCAT, PERRLA, EOMI, red conjunctiva  Neck: supple, no masses, trachea midline  Lungs: Equal chest rise bilateral, no retractions, no wheezing  Heart: Reg rate  Abdomen: soft, non tender, non distended   Skin; warm and dry, no cyanosis  Gu: no cva tenderness  Extremities: atraumatic, no focal motor deficits, no open wounds  Psych: No tremor, visual hallucinations      ASSESSMENT:  63 y.o. male with symptomatic R inguinal hernia    PLAN:  Risks benefits and alternatives to procedure were explained and he is agreeable  Robotic R inguinal hernia repair today    Electronically signed by Tiffanie Lai DO on 4/26/21 at 7:03 AM EDT

## 2021-04-28 ENCOUNTER — TELEPHONE (OUTPATIENT)
Dept: FAMILY MEDICINE CLINIC | Age: 61
End: 2021-04-28

## 2021-04-28 NOTE — TELEPHONE ENCOUNTER
Dre Wang calling to ask what he can take to have a BM? He had hernia surgery on Monday and has not had a bm since that morning. He is in some discomfort and trying to have a BM, but cant push. He is afraid to just take something without asking you. Is Dulcolax a good choice, or do you recommend something else?

## 2021-04-28 NOTE — TELEPHONE ENCOUNTER
Dulcolax is fine. Have him get on stool softener twice a day for now. Can have as needed MiraLAX if needed. Make sure drinking plenty of fluid.

## 2021-04-29 ENCOUNTER — TELEPHONE (OUTPATIENT)
Dept: SURGERY | Age: 61
End: 2021-04-29

## 2021-04-29 NOTE — TELEPHONE ENCOUNTER
MA got a call from the patient asking what he can take for his constipation. Dr. Keaton Flores stated he can take colace or miralax. Patient stated he did try those two things and it helped.     Electronically signed by Sha Hilliard on 4/29/21 at 9:17 AM EDT

## 2021-05-07 ENCOUNTER — OFFICE VISIT (OUTPATIENT)
Dept: SURGERY | Age: 61
End: 2021-05-07

## 2021-05-07 VITALS
OXYGEN SATURATION: 98 % | DIASTOLIC BLOOD PRESSURE: 75 MMHG | HEART RATE: 59 BPM | BODY MASS INDEX: 27.8 KG/M2 | SYSTOLIC BLOOD PRESSURE: 123 MMHG | TEMPERATURE: 97.8 F | RESPIRATION RATE: 16 BRPM | HEIGHT: 71 IN | WEIGHT: 198.6 LBS

## 2021-05-07 DIAGNOSIS — Z09 STATUS POST RIGHT INGUINAL HERNIA REPAIR, FOLLOW-UP EXAM: Primary | ICD-10-CM

## 2021-05-07 PROCEDURE — 99024 POSTOP FOLLOW-UP VISIT: CPT | Performed by: SURGERY

## 2021-05-07 NOTE — PROGRESS NOTES
[facility] Note    Assessment/Plan:      Diagnosis Orders   1. Status post right inguinal hernia repair, follow-up exam      Doing well. No issues. Continue restrictions. Going back to work on the 24th         Return in about 4 weeks (around 6/4/2021). Chief Complaint   Patient presents with    Post-Op Check     rt ing hernia repair. states he is doing good. still has some pain on rt side. PCP: Nathalie Gonzalez MD    HPI: Natty Oswald is a 63 y.o. male who presents in follow-up for postop right inguinal hernia repair. He is doing well. He has no issues. He feels better than he did preoperatively. He has had no bulging. He had a little constipation the first couple of days after surgery but since then has been doing well. Past Medical History:   Diagnosis Date    Colon polyps     COVID-19 12/2020    Diverticulosis     Hyperlipidemia        Past Surgical History:   Procedure Laterality Date    COLONOSCOPY      3 years ago    HERNIA REPAIR Right 4/26/2021    HERNIA INGUINAL REPAIR LAPAROSCOPIC ROBOTIC XI WITH MESH performed by Juan Carlos Rosado MD at [address] [facility] [address]       Prior to Admission medications    Medication Sig Start Date End Date Taking?  Authorizing Provider   Multiple Vitamins-Minerals (PRESERVISION AREDS PO) Take by mouth   Yes Historical Provider, MD   Specialty Vitamins Products (COLLAGEN ULTRA) CAPS Take by mouth   Yes Historical Provider, MD   Omega-3 Fatty Acids (FISH OIL) 1000 MG CAPS Take 1,000 mg by mouth daily   Yes Historical Provider, MD       No Known Allergies    Social History     Socioeconomic History    Marital status:      Spouse name: None    Number of children: None    Years of education: None    Highest education level: None   Occupational History    None   Social Needs    Financial resource strain: None    Food insecurity     Worry: None     Inability: None    Transportation needs     Medical: None     Non-medical: None   Tobacco Use  Smoking status: Former Smoker     Packs/day: 1.00     Years: 25.00     Pack years: 25.00     Types: Cigarettes     Quit date: 1/1/2006     Years since quitting: 15.3    Smokeless tobacco: Never Used   Substance and Sexual Activity    Alcohol use: Yes     Comment: 6 pack/week    Drug use: Never    Sexual activity: None   Lifestyle    Physical activity     Days per week: None     Minutes per session: None    Stress: None   Relationships    Social connections     Talks on phone: None     Gets together: None     Attends [employer] service: None     Active member of club or organization: None     Attends meetings of clubs or organizations: None     Relationship status: None    Intimate partner violence     Fear of current or ex partner: None     Emotionally abused: None     Physically abused: None     Forced sexual activity: None   Other Topics Concern    None   Social History Narrative    None       Family History   Problem Relation Age of Onset    Diabetes Mother     Prostate Cancer Father     High Blood Pressure Father     Alzheimer's Disease Sister     Colon Cancer Brother        Review of Systems            Objective:  Vitals:    05/07/21 0831   BP: 123/75   Pulse: 59   Resp: 16   Temp: 97.8 °F (36.6 °C)   TempSrc: Temporal   SpO2: 98%   Weight: 198 lb 9.6 oz (90.1 kg)   Height: 5' 11\" (1.803 m)          Physical Exam  Constitutional:       General: He is not in acute distress. Appearance: He is not diaphoretic. Cardiovascular:      Rate and Rhythm: Normal rate. Pulmonary:      Effort: Pulmonary effort is normal. No respiratory distress. Abdominal:      General: There is no distension. Palpations: Abdomen is soft. Tenderness: There is no abdominal tenderness. There is no guarding or rebound.       Comments: No evidence of hernia recurrence or fluid collection                 Demetrius Swartz MD  5/7/2021    NOTE: This report, in part or full,may have been transcribed using voice recognition software. Every effort was made to ensure accuracy; however, inadvertent computerized transcription errors may be present. Please excuse any transcriptional grammatical or spelling errors that may have escaped my editorial review.     CC: Hernesto Sandra MD

## 2021-05-19 ENCOUNTER — TELEPHONE (OUTPATIENT)
Dept: SURGERY | Age: 61
End: 2021-05-19

## 2021-05-19 NOTE — TELEPHONE ENCOUNTER
Patient called and wanted to return to work on 05-20-21. MA spoke with Dr Yris Whitman who stated that patient may return with restrictions of not lifting more than 20 pounds and no strenuous work until follow up appointment on 06-08-21.   Electronically signed by Nupur Wilks MA on 5/19/2021 at 9:10 AM

## 2021-06-08 ENCOUNTER — OFFICE VISIT (OUTPATIENT)
Dept: SURGERY | Age: 61
End: 2021-06-08

## 2021-06-08 VITALS
RESPIRATION RATE: 18 BRPM | OXYGEN SATURATION: 98 % | WEIGHT: 195.8 LBS | BODY MASS INDEX: 27.41 KG/M2 | TEMPERATURE: 99 F | SYSTOLIC BLOOD PRESSURE: 119 MMHG | HEIGHT: 71 IN | DIASTOLIC BLOOD PRESSURE: 69 MMHG | HEART RATE: 65 BPM

## 2021-06-08 DIAGNOSIS — Z09 STATUS POST RIGHT INGUINAL HERNIA REPAIR, FOLLOW-UP EXAM: Primary | ICD-10-CM

## 2021-06-08 PROCEDURE — 99024 POSTOP FOLLOW-UP VISIT: CPT | Performed by: SURGERY

## 2021-08-06 ENCOUNTER — TELEPHONE (OUTPATIENT)
Dept: FAMILY MEDICINE CLINIC | Age: 61
End: 2021-08-06

## 2021-08-06 DIAGNOSIS — Z00.00 ROUTINE GENERAL MEDICAL EXAMINATION AT A HEALTH CARE FACILITY: Primary | ICD-10-CM

## 2021-08-06 NOTE — TELEPHONE ENCOUNTER
----- Message from Zaida Baker sent at 8/6/2021  9:36 AM EDT -----  Subject: Appointment Request    Reason for Call: Routine Physical Exam    QUESTIONS  Type of Appointment? Established Patient  Reason for appointment request? No appointments available during search  Additional Information for Provider? Patient is calling to schedule his   yearly physical. Patient needs the physical before AUG 30th because he   will loose his insurance at the end of the month. Patient would like to   know if the Dr just wants him to get blood work or if possible come in. Monday and Fridays are the best. Patient is unavailable between the 16-20   and 30-31. Please call patient to schedule.   ---------------------------------------------------------------------------  --------------  CALL BACK INFO  What is the best way for the office to contact you? OK to leave message on   voicemail  Preferred Call Back Phone Number? [phone]  ---------------------------------------------------------------------------  --------------  SCRIPT ANSWERS  Relationship to Patient? Self  (If the patient has Medicare as their primary insurance coverage ask this   question) Are you requesting a Medicare Annual Wellness Visit? No  (Is the patient requesting a pap smear with their physical exam?)? No  (Is the patient requesting their annual physical and does not need PAP or   AWV per above?)? Yes   Have you been diagnosed with, awaiting test results for, or told that you   are suspected of having COVID-19 (Coronavirus)? (If patient has tested   negative or was tested as a requirement for work, school, or travel and   not based on symptoms, answer no)? Yes  Did your symptoms begin within the past 14 days or was your positive test   result within the past 14 days? No  Do you currently have flu-like symptoms including fever or chills, cough,   shortness of breath, difficulty breathing, or new loss of taste or smell?    No  Have you had close contact with someone with COVID-19 in the last 14 days? No  (Service Expert  click yes below to proceed with [employer] As Usual   Scheduling)?  Yes

## 2021-08-09 NOTE — TELEPHONE ENCOUNTER
Kevin Olson is scheduled on 8/25 @4:30. He would like blood work done prior to appt as well as a COVID antibody test ordered If paid for by insurance. Please advise.

## 2021-08-10 ENCOUNTER — TELEPHONE (OUTPATIENT)
Dept: FAMILY MEDICINE CLINIC | Age: 61
End: 2021-08-10

## 2021-08-10 DIAGNOSIS — Z86.16 HISTORY OF COVID-19: Primary | ICD-10-CM

## 2021-08-10 NOTE — TELEPHONE ENCOUNTER
----- Message from Yomi Matamoros sent at 8/9/2021  4:41 PM EDT -----  Subject: Message to Provider    QUESTIONS  Information for Provider? Patient would like to know if he can also   receive the covid 19 antibodies test, since he did have the virus in   12/2020. and if so will it be covered by his insurance. Please call back  ---------------------------------------------------------------------------  --------------  CALL BACK INFO  What is the best way for the office to contact you? OK to leave message on   voicemail  Preferred Call Back Phone Number? [phone]  ---------------------------------------------------------------------------  --------------  SCRIPT ANSWERS  Relationship to Patient?  Self

## 2021-08-11 NOTE — TELEPHONE ENCOUNTER
Left detailed message on patients VM letting him know that lab orders were placed including a covid antibody test. Advised him that we are unsure as to whether the antibody test will be covered or not.

## 2021-09-27 ENCOUNTER — TELEPHONE (OUTPATIENT)
Dept: FAMILY MEDICINE CLINIC | Age: 61
End: 2021-09-27

## 2022-05-17 ENCOUNTER — OFFICE VISIT (OUTPATIENT)
Dept: FAMILY MEDICINE CLINIC | Age: 62
End: 2022-05-17
Payer: COMMERCIAL

## 2022-05-17 VITALS
TEMPERATURE: 97.5 F | DIASTOLIC BLOOD PRESSURE: 78 MMHG | SYSTOLIC BLOOD PRESSURE: 128 MMHG | WEIGHT: 188 LBS | HEART RATE: 78 BPM | BODY MASS INDEX: 26.22 KG/M2 | OXYGEN SATURATION: 97 %

## 2022-05-17 DIAGNOSIS — J06.9 UPPER RESPIRATORY TRACT INFECTION, UNSPECIFIED TYPE: Primary | ICD-10-CM

## 2022-05-17 PROCEDURE — 99213 OFFICE O/P EST LOW 20 MIN: CPT | Performed by: PHYSICIAN ASSISTANT

## 2022-05-17 RX ORDER — DOXYCYCLINE HYCLATE 100 MG
100 TABLET ORAL 2 TIMES DAILY
Qty: 20 TABLET | Refills: 0 | Status: SHIPPED | OUTPATIENT
Start: 2022-05-17 | End: 2022-05-27

## 2022-05-17 RX ORDER — METHYLPREDNISOLONE 4 MG/1
TABLET ORAL
Qty: 1 KIT | Refills: 0 | Status: SHIPPED
Start: 2022-05-17 | End: 2022-08-24 | Stop reason: ALTCHOICE

## 2022-05-17 RX ORDER — BENZONATATE 100 MG/1
100 CAPSULE ORAL 2 TIMES DAILY PRN
Qty: 20 CAPSULE | Refills: 0 | Status: SHIPPED | OUTPATIENT
Start: 2022-05-17 | End: 2022-05-27

## 2022-05-17 ASSESSMENT — ENCOUNTER SYMPTOMS
VOMITING: 0
COUGH: 1
SHORTNESS OF BREATH: 0
DIARRHEA: 0
NAUSEA: 0
PHOTOPHOBIA: 0
SORE THROAT: 0
ABDOMINAL PAIN: 0
BACK PAIN: 0

## 2022-08-24 ENCOUNTER — OFFICE VISIT (OUTPATIENT)
Dept: FAMILY MEDICINE CLINIC | Age: 62
End: 2022-08-24
Payer: COMMERCIAL

## 2022-08-24 VITALS
HEART RATE: 54 BPM | SYSTOLIC BLOOD PRESSURE: 118 MMHG | BODY MASS INDEX: 25.79 KG/M2 | TEMPERATURE: 97.5 F | WEIGHT: 184.2 LBS | HEIGHT: 71 IN | OXYGEN SATURATION: 97 % | DIASTOLIC BLOOD PRESSURE: 68 MMHG

## 2022-08-24 DIAGNOSIS — E55.9 VITAMIN D DEFICIENCY: ICD-10-CM

## 2022-08-24 DIAGNOSIS — Z12.5 SCREENING FOR MALIGNANT NEOPLASM OF PROSTATE: ICD-10-CM

## 2022-08-24 DIAGNOSIS — Z00.00 ROUTINE GENERAL MEDICAL EXAMINATION AT A HEALTH CARE FACILITY: ICD-10-CM

## 2022-08-24 LAB
ALBUMIN SERPL-MCNC: 4.4 G/DL (ref 3.5–5.2)
ALP BLD-CCNC: 80 U/L (ref 40–129)
ALT SERPL-CCNC: 14 U/L (ref 0–40)
ANION GAP SERPL CALCULATED.3IONS-SCNC: 13 MMOL/L (ref 7–16)
AST SERPL-CCNC: 21 U/L (ref 0–39)
BASOPHILS ABSOLUTE: 0.07 E9/L (ref 0–0.2)
BASOPHILS RELATIVE PERCENT: 1.2 % (ref 0–2)
BILIRUB SERPL-MCNC: 0.4 MG/DL (ref 0–1.2)
BILIRUBIN URINE: NEGATIVE
BLOOD, URINE: NEGATIVE
BUN BLDV-MCNC: 14 MG/DL (ref 6–23)
CALCIUM SERPL-MCNC: 9.8 MG/DL (ref 8.6–10.2)
CHLORIDE BLD-SCNC: 102 MMOL/L (ref 98–107)
CHOLESTEROL, TOTAL: 217 MG/DL (ref 0–199)
CLARITY: CLEAR
CO2: 24 MMOL/L (ref 22–29)
COLOR: YELLOW
CREAT SERPL-MCNC: 1 MG/DL (ref 0.7–1.2)
EOSINOPHILS ABSOLUTE: 0.1 E9/L (ref 0.05–0.5)
EOSINOPHILS RELATIVE PERCENT: 1.8 % (ref 0–6)
GFR AFRICAN AMERICAN: >60
GFR NON-AFRICAN AMERICAN: >60 ML/MIN/1.73
GLUCOSE BLD-MCNC: 78 MG/DL (ref 74–99)
GLUCOSE URINE: NEGATIVE MG/DL
HCT VFR BLD CALC: 44.6 % (ref 37–54)
HDLC SERPL-MCNC: 35 MG/DL
HEMOGLOBIN: 14.1 G/DL (ref 12.5–16.5)
IMMATURE GRANULOCYTES #: 0.02 E9/L
IMMATURE GRANULOCYTES %: 0.4 % (ref 0–5)
KETONES, URINE: NEGATIVE MG/DL
LDL CHOLESTEROL CALCULATED: 122 MG/DL (ref 0–99)
LEUKOCYTE ESTERASE, URINE: NEGATIVE
LYMPHOCYTES ABSOLUTE: 1.86 E9/L (ref 1.5–4)
LYMPHOCYTES RELATIVE PERCENT: 32.8 % (ref 20–42)
MAGNESIUM: 2.3 MG/DL (ref 1.6–2.6)
MCH RBC QN AUTO: 30.4 PG (ref 26–35)
MCHC RBC AUTO-ENTMCNC: 31.6 % (ref 32–34.5)
MCV RBC AUTO: 96.1 FL (ref 80–99.9)
MONOCYTES ABSOLUTE: 0.57 E9/L (ref 0.1–0.95)
MONOCYTES RELATIVE PERCENT: 10.1 % (ref 2–12)
NEUTROPHILS ABSOLUTE: 3.05 E9/L (ref 1.8–7.3)
NEUTROPHILS RELATIVE PERCENT: 53.7 % (ref 43–80)
NITRITE, URINE: NEGATIVE
PDW BLD-RTO: 13.4 FL (ref 11.5–15)
PH UA: 6 (ref 5–9)
PLATELET # BLD: 235 E9/L (ref 130–450)
PMV BLD AUTO: 11.5 FL (ref 7–12)
POTASSIUM SERPL-SCNC: 4.4 MMOL/L (ref 3.5–5)
PROSTATE SPECIFIC ANTIGEN: 0.29 NG/ML (ref 0–4)
PROTEIN UA: NEGATIVE MG/DL
RBC # BLD: 4.64 E12/L (ref 3.8–5.8)
SODIUM BLD-SCNC: 139 MMOL/L (ref 132–146)
SPECIFIC GRAVITY UA: <=1.005 (ref 1–1.03)
TOTAL PROTEIN: 7.1 G/DL (ref 6.4–8.3)
TRIGL SERPL-MCNC: 301 MG/DL (ref 0–149)
UROBILINOGEN, URINE: 0.2 E.U./DL
VITAMIN D 25-HYDROXY: 34 NG/ML (ref 30–100)
VLDLC SERPL CALC-MCNC: 60 MG/DL
WBC # BLD: 5.7 E9/L (ref 4.5–11.5)

## 2022-08-24 PROCEDURE — 81003 URINALYSIS AUTO W/O SCOPE: CPT | Performed by: INTERNAL MEDICINE

## 2022-08-24 PROCEDURE — 99396 PREV VISIT EST AGE 40-64: CPT | Performed by: INTERNAL MEDICINE

## 2022-08-24 RX ORDER — VITAMIN B COMPLEX
TABLET ORAL
COMMUNITY

## 2022-08-24 RX ORDER — MULTIVITAMIN WITH IRON
250 TABLET ORAL DAILY
COMMUNITY

## 2022-08-24 SDOH — ECONOMIC STABILITY: FOOD INSECURITY: WITHIN THE PAST 12 MONTHS, YOU WORRIED THAT YOUR FOOD WOULD RUN OUT BEFORE YOU GOT MONEY TO BUY MORE.: NEVER TRUE

## 2022-08-24 SDOH — ECONOMIC STABILITY: FOOD INSECURITY: WITHIN THE PAST 12 MONTHS, THE FOOD YOU BOUGHT JUST DIDN'T LAST AND YOU DIDN'T HAVE MONEY TO GET MORE.: NEVER TRUE

## 2022-08-24 ASSESSMENT — PATIENT HEALTH QUESTIONNAIRE - PHQ9
2. FEELING DOWN, DEPRESSED OR HOPELESS: 0
SUM OF ALL RESPONSES TO PHQ QUESTIONS 1-9: 0
1. LITTLE INTEREST OR PLEASURE IN DOING THINGS: 0
SUM OF ALL RESPONSES TO PHQ QUESTIONS 1-9: 0
SUM OF ALL RESPONSES TO PHQ9 QUESTIONS 1 & 2: 0
SUM OF ALL RESPONSES TO PHQ QUESTIONS 1-9: 0
SUM OF ALL RESPONSES TO PHQ QUESTIONS 1-9: 0

## 2022-08-24 ASSESSMENT — SOCIAL DETERMINANTS OF HEALTH (SDOH): HOW HARD IS IT FOR YOU TO PAY FOR THE VERY BASICS LIKE FOOD, HOUSING, MEDICAL CARE, AND HEATING?: NOT HARD AT ALL

## 2022-08-24 NOTE — PROGRESS NOTES
[address] [place] [place]     22  Farrukh Reid : 1960 Sex: male  Age: 60 y.o. Chief Complaint   Patient presents with    Annual Exam     1 year complete       HPI  Patient presents today for overdue annual follow-up complete preventative visit. Reviewed last couple notes. Since have seen him last he has seen Dr. Jacque Goodwin and had right inguinal herniorrhaphy which went well. He has also had colonoscopy done  and will be due again in 5 years. States has been feeling well outside of some arthritic complaints. Review of Systems  Const: Denies chills, fever and sweats. Eyes: Denies eye symptoms. ENMT: Denies ear symptoms. Reports postnasal drip, but denies other nasal symptoms. Denies  mouth or throat symptoms. CV: Denies chest pain, orthopnea and palpitations. Resp: Denies cough, SOB and wheezing. GI: Denies diarrhea, nausea and vomiting. : Urinary: denies dysuria, frequency and frequent UTI's. Musculo: Reports arthritis./Right hand discomfort-see above  Skin: Denies skin, hair and nail symptoms. Neuro: Denies dizziness, headache, seizures and syncope. Psych: Denies psychiatric symptoms. REST OF PERTINENT ROS GONE OVER AND WAS NEGATIVE. PMH:  Health Maintenance:  Colonoscopy - (2014), , -due   Prostate Exam - ,,6/15, , ,   Psa Test - ,,6/15, , ,   Rectal Exam - ,,6/15, ,   Colonoscopy - ,- due 20  Medical Problems:  Hyperlipidemia, Colon Polyps, Diverticulosis  Reviewed and updated. Surgical problems  Right inguinal herniorrhaphy    FH:  Father:  Prostate Cancer, Hypertension. Mother:  Non Insulin Dependent Diabetes. Brother 1:  Colon Cancer - . Sister 1:  None. Sister 2:  Alzheimer's Disease - at age 61. Sister 3:  No Current Problems. Gómez Chávez  1960 Page #2  Reviewed, no changes.   SH:  . (Marital)  Personal Habits: Cigarette Use: Former Cigarette Smoker. Alcohol: Current Alcohol Use - 6 Pack per  week. Reviewed and updated.                  Current Outpatient Medications:     Vitamin D (CHOLECALCIFEROL) 25 MCG (1000 UT) TABS tablet, Take by mouth, Disp: , Rfl:     magnesium (MAGNESIUM-OXIDE) 250 MG TABS tablet, Take 250 mg by mouth daily, Disp: , Rfl:     Multiple Vitamins-Minerals (PRESERVISION AREDS PO), Take by mouth, Disp: , Rfl:     Specialty Vitamins Products (COLLAGEN ULTRA) CAPS, Take by mouth, Disp: , Rfl:     Omega-3 Fatty Acids (FISH OIL) 1000 MG CAPS, Take 1,000 mg by mouth daily, Disp: , Rfl:   No Known Allergies    Past Medical History:   Diagnosis Date    Colon polyps     COVID-19 2020    Diverticulosis     Hyperlipidemia      Past Surgical History:   Procedure Laterality Date    COLONOSCOPY      3 years ago    HERNIA REPAIR Right 2021    HERNIA INGUINAL REPAIR LAPAROSCOPIC ROBOTIC XI WITH MESH performed by Chidi Trejo MD at [facility] OR     Family History   Problem Relation Age of Onset    Diabetes Mother     Prostate Cancer Father     High Blood Pressure Father     Alzheimer's Disease Sister     Colon Cancer Brother      Social History     Socioeconomic History    Marital status:      Spouse name: Not on file    Number of children: Not on file    Years of education: Not on file    Highest education level: Not on file   Occupational History    Not on file   Tobacco Use    Smoking status: Former     Packs/day: 1.00     Years: 25.00     Pack years: 25.00     Types: Cigarettes     Quit date: 2006     Years since quittin.6    Smokeless tobacco: Never   Vaping Use    Vaping Use: Never used   Substance and Sexual Activity    Alcohol use: Yes     Comment: 6 pack/week    Drug use: Never    Sexual activity: Not on file   Other Topics Concern    Not on file   Social History Narrative    Not on file     Social Determinants of Health     Financial Resource Strain: Low Risk     Difficulty of Paying Living Expenses: Not hard at all   Food Insecurity: No Food Insecurity    Worried About Running Out of Food in the Last Year: Never true    Ran Out of Food in the Last Year: Never true   Transportation Needs: Not on file   Physical Activity: Not on file   Stress: Not on file   Social Connections: Not on file   Intimate Partner Violence: Not on file   Housing Stability: Not on file       Vitals:    08/24/22 1135   BP: 118/68   Pulse: 54   Temp: 97.5 °F (36.4 °C)   TempSrc: Temporal   SpO2: 97%   Weight: 184 lb 3.2 oz (83.6 kg)   Height: 5' 11\" (1.803 m)       Physical Exam  Const: Appears well developed and well nourished. No signs of acute distress present. Eyes: EOMI in both eyes. PERRL. ENMT: . (External Ears) Tympanic membranes are intact. External nose WNL. Moistness and  normal color of the nasal mucosae. Nasal septum. (Septum) . (Teeth) Gums appear healthy. Posterior pharynx shows no exudate, irritation or redness. Neck: Supple and symmetric. Palpation reveals no adenopathy. No masses appreciated. Thyroid  exhibits no nodule or thyromegaly. No JVD. Carotids: 2+ and equal bilaterally, without bruits. Resp: No rales, rhonchi or wheezes appreciated over the lungs bilaterally. CV: Rate is regular. Rhythm is regular. S1 is normal. S2 is normal. No gallop or rubs. No heart  murmur appreciated. Extremities: No clubbing, cyanosis or edema. Abdomen: Bowel sounds are normoactive. Palpation reveals softness, with no distension,  organomegaly or tenderness. No abdominal masses palpable. No palpable hepatosplenomegaly. Musculo: Walks with a normal gait. Upper Extremities: Full ROM bilaterally/osteoarthritic changes to  hands Lower Extremities: Full ROM bilaterally. Skin: Dry and warm with no rash. Neuro: Alert and oriented x3. Mood is normal. Affect is normal. Speech is articulate and fluent. Upper Extremities: motor strength is intact. Normal muscle tone bilaterally.  Lower Extremities: motor  strength is intact. Normal muscle tone bilaterally. Sensation intact to light touch. Reflexes: DTR's are  symmetric, intact and 2+ bilaterally. Cranial Nerves: Cranial nerves II-XII intact. Psych: Patient's attitude is cooperative. Patient's affect is appropriate. Judgement is realistic. Insight  is appropriate. Rectal Examination: Sphincter tone good. Stool for occult blood guaiac negative. No rectal masses noted. Prostate not palpably enlarged. Genital examination unremarkable             Assessment and Plan:  Pierre Yan was seen today for annual exam.    Diagnoses and all orders for this visit:    Routine general medical examination at a health care facility  -     Vitamin D 25 Hydroxy; Future  -     CBC with Auto Differential; Future  -     Comprehensive Metabolic Panel; Future  -     Lipid Panel; Future  -     Magnesium; Future  -     Urinalysis; Future    Vitamin D deficiency  -     Vitamin D 25 Hydroxy; Future    Screening for malignant neoplasm of prostate  -     PSA Screening; Future    Plan: We will set him up for 1 year with new physician for complete exam.  Blood work today occluding PSA. Notify us of problems in the interim. Continue activity level as tolerated. Return in about 1 year (around 8/24/2023). Seen By:  Yulissa Albright MD      *Document was created using voice recognition software. Note was reviewed however may contain grammatical errors.

## 2022-08-25 ENCOUNTER — TELEPHONE (OUTPATIENT)
Dept: FAMILY MEDICINE CLINIC | Age: 62
End: 2022-08-25

## 2022-08-25 NOTE — TELEPHONE ENCOUNTER
Labs okay except triglycerides remain high at 300. Limit fatty food intake and alcohol consumption. Please send copy of labs to patient.

## 2023-02-02 NOTE — PROGRESS NOTES
22  Staci Avalos : 1960 Sex: male  Age 60 y.o. Subjective:  Chief Complaint   Patient presents with    Sinusitis    Headache    Chest Congestion         58-year-old male presents to the walk-in clinic for evaluation of congestion for 2 days that is now settled into his chest.  He states that Mucinex helps with the sinus congestion. He states he did have some sputum production with coughing this morning which made him feel better. He states he also gargled with warm salt water yesterday. He denies any fever, chills, nausea or vomiting. No shortness of breath or chest pain. He denies any hemoptysis. He denies any known sick contacts. He is not vaccinated for COVID-19. He states he did have COVID-19 in . He denies lightheadedness, dizziness or syncope. He states he took ibuprofen this morning which has made him feel pretty good. Review of Systems   Constitutional: Negative for chills and fever. HENT: Positive for congestion. Negative for ear pain and sore throat. Eyes: Negative for photophobia and visual disturbance. Respiratory: Positive for cough. Negative for shortness of breath. Cardiovascular: Negative for chest pain. Gastrointestinal: Negative for abdominal pain, diarrhea, nausea and vomiting. Genitourinary: Negative for difficulty urinating, dysuria, frequency and urgency. Musculoskeletal: Negative for back pain, neck pain and neck stiffness. Skin: Negative for rash. Neurological: Negative for dizziness, syncope, weakness, light-headedness and headaches. Hematological: Negative for adenopathy. Does not bruise/bleed easily. Psychiatric/Behavioral: Negative for agitation and confusion. All other systems reviewed and are negative.         PMH:     Past Medical History:   Diagnosis Date    Colon polyps     COVID-19 2020    Diverticulosis     Hyperlipidemia        Past Surgical History:   Procedure Laterality Date    COLONOSCOPY      3 years ago    HERNIA REPAIR Right 2021    HERNIA INGUINAL REPAIR LAPAROSCOPIC ROBOTIC XI WITH MESH performed by Coy Arellano MD at [facility] OR       Family History   Problem Relation Age of Onset    Diabetes Mother     Prostate Cancer Father     High Blood Pressure Father     Alzheimer's Disease Sister     Colon Cancer Brother        Medications:     Current Outpatient Medications:     doxycycline hyclate (VIBRA-TABS) 100 MG tablet, Take 1 tablet by mouth 2 times daily for 10 days, Disp: 20 tablet, Rfl: 0    methylPREDNISolone (MEDROL DOSEPACK) 4 MG tablet, Take by mouth., Disp: 1 kit, Rfl: 0    benzonatate (TESSALON) 100 MG capsule, Take 1 capsule by mouth 2 times daily as needed for Cough, Disp: 20 capsule, Rfl: 0    Multiple Vitamins-Minerals (PRESERVISION AREDS PO), Take by mouth, Disp: , Rfl:     Specialty Vitamins Products (COLLAGEN ULTRA) CAPS, Take by mouth, Disp: , Rfl:     Omega-3 Fatty Acids (FISH OIL) 1000 MG CAPS, Take 1,000 mg by mouth daily, Disp: , Rfl:     Allergies:   No Known Allergies    Social History:     Social History     Tobacco Use    Smoking status: Former Smoker     Packs/day: 1.00     Years: 25.00     Pack years: 25.00     Types: Cigarettes     Quit date: 2006     Years since quittin.3    Smokeless tobacco: Never Used   Vaping Use    Vaping Use: Never used   Substance Use Topics    Alcohol use: Yes     Comment: 6 pack/week    Drug use: Never       Patient lives at home. Physical Exam:     Vitals:    22 1016   BP: 128/78   Pulse: 78   Temp: 97.5 °F (36.4 °C)   TempSrc: Temporal   SpO2: 97%   Weight: 188 lb (85.3 kg)       Exam:  Physical Exam  Vitals and nursing note reviewed. Constitutional:       General: He is not in acute distress. Appearance: He is well-developed. HENT:      Head: Normocephalic and atraumatic.       Right Ear: Tympanic membrane normal.      Left Ear: Tympanic membrane normal.      Nose: Nose normal.      Mouth/Throat: Mouth: Mucous membranes are moist.      Pharynx: Oropharynx is clear. Eyes:      Conjunctiva/sclera: Conjunctivae normal.      Pupils: Pupils are equal, round, and reactive to light. Cardiovascular:      Rate and Rhythm: Normal rate and regular rhythm. Pulmonary:      Effort: Pulmonary effort is normal. No respiratory distress. Breath sounds: Normal breath sounds. Abdominal:      General: Bowel sounds are normal.      Palpations: Abdomen is soft. Tenderness: There is no abdominal tenderness. Musculoskeletal:         General: Normal range of motion. Cervical back: Normal range of motion. No rigidity. Lymphadenopathy:      Cervical: No cervical adenopathy. Skin:     General: Skin is warm and dry. Neurological:      General: No focal deficit present. Mental Status: He is alert and oriented to person, place, and time. Psychiatric:         Mood and Affect: Mood normal.         Behavior: Behavior normal.         Thought Content: Thought content normal.         Judgment: Judgment normal.           Testing:           Medical Decision Making:     Patient upon arrival did not appear toxic or lethargic. Vital signs were reviewed. Past medical history reviewed. Allergies reviewed. Medications reviewed. Patient is presenting with the above complaint of cough and congestion. Differential diagnosis was discussed with the patient. Patient will be given a prescription for doxycycline, Medrol Dosepak and Tessalon Perles. Patient may use over-the-counter analgesics and decongestants as needed. Patient is continue to monitor symptoms and follow-up with their primary care provider in 3-5 days if no improvement. Patient will return or go to the emergency department for any worsening symptoms. Patient understands the plan is agreeable. Clinical Impression:   Chasity Mccray was seen today for sinusitis, headache and chest congestion.     Diagnoses and all orders for this visit:    Upper respiratory tract infection, unspecified type    Other orders  -     doxycycline hyclate (VIBRA-TABS) 100 MG tablet; Take 1 tablet by mouth 2 times daily for 10 days  -     methylPREDNISolone (MEDROL DOSEPACK) 4 MG tablet; Take by mouth. -     benzonatate (TESSALON) 100 MG capsule; Take 1 capsule by mouth 2 times daily as needed for Cough        The patient is to call for any concerns or return if any of the signs or symptoms worsen. The patient is to follow-up with PCP in the next 2-3 days for repeat evaluation repeat assessment or go directly to the emergency department. SIGNATURE: Nellie Wylie PA-C Negative

## 2023-02-20 ENCOUNTER — OFFICE VISIT (OUTPATIENT)
Dept: FAMILY MEDICINE CLINIC | Age: 63
End: 2023-02-20
Payer: COMMERCIAL

## 2023-02-20 VITALS
RESPIRATION RATE: 20 BRPM | WEIGHT: 182 LBS | OXYGEN SATURATION: 99 % | BODY MASS INDEX: 25.48 KG/M2 | SYSTOLIC BLOOD PRESSURE: 120 MMHG | HEART RATE: 75 BPM | TEMPERATURE: 97.2 F | DIASTOLIC BLOOD PRESSURE: 86 MMHG | HEIGHT: 71 IN

## 2023-02-20 DIAGNOSIS — J40 SINOBRONCHITIS: Primary | ICD-10-CM

## 2023-02-20 DIAGNOSIS — J32.9 SINOBRONCHITIS: Primary | ICD-10-CM

## 2023-02-20 PROCEDURE — 99213 OFFICE O/P EST LOW 20 MIN: CPT | Performed by: PHYSICIAN ASSISTANT

## 2023-02-20 RX ORDER — METHYLPREDNISOLONE 4 MG/1
TABLET ORAL
Qty: 1 KIT | Refills: 0 | Status: SHIPPED | OUTPATIENT
Start: 2023-02-20 | End: 2023-02-26

## 2023-02-20 RX ORDER — DOXYCYCLINE HYCLATE 100 MG
100 TABLET ORAL 2 TIMES DAILY
Qty: 20 TABLET | Refills: 0 | Status: SHIPPED | OUTPATIENT
Start: 2023-02-20 | End: 2023-03-02

## 2023-02-20 NOTE — PROGRESS NOTES
Chief Complaint       Chest Congestion (Mucus yellow ) and Nasal Congestion      History of Present Illness   Source of history provided by:  patient. Luba Rodríguez is a 60 y.o. old male presenting to the walk in clinic for evaluation of sinus pressure, nasal congestion, chest congestion, productive cough with yellow sputum, and coughing fits which have been present for the past 4 days. Denies any fever, chills, loss of taste or smell, CP, dyspnea, LE edema, abdominal pain, vomiting, rash, or lethargy. Denies any hx of asthma, COPD, or tobacco use. Patient denies recent sick exposures. ROS    Unless otherwise stated in this report or unable to obtain because of the patient's clinical or mental status as evidenced by the medical record, this patients's positive and negative responses for Review of Systems, constitutional, psych, eyes, ENT, cardiovascular, respiratory, gastrointestinal, neurological, genitourinary, musculoskeletal, integument systems and systems related to the presenting problem are either stated in the preceding or were not pertinent or were negative for the symptoms and/or complaints related to the medical problem. Past Medical History:  has a past medical history of Colon polyps, COVID-19, Diverticulosis, and Hyperlipidemia. Past Surgical History:  has a past surgical history that includes Colonoscopy and hernia repair (Right, 4/26/2021). Social History:  reports that he quit smoking about 17 years ago. His smoking use included cigarettes. He has a 25.00 pack-year smoking history. He has never used smokeless tobacco. He reports current alcohol use. He reports that he does not use drugs. Family History: family history includes Alzheimer's Disease in his sister; Colon Cancer in his brother; Diabetes in his mother; High Blood Pressure in his father; Prostate Cancer in his father. Allergies: Patient has no known allergies.     Physical Exam         VS:  /86   Pulse 75 Temp 97.2 °F (36.2 °C)   Resp 20   Ht 5' 11\" (1.803 m)   Wt 182 lb (82.6 kg)   SpO2 99%   BMI 25.38 kg/m²    Oxygen Saturation Interpretation: Normal.    Constitutional:  Alert, development consistent with age. NAD. Head:  NC/NT. Airway patent. Mild TTP noted over the bilateral maxillary sinuses. Mouth: Posterior pharynx with mild erythema and clear postnasal drip. No tonsillar hypertrophy or exudate. Neck:  Normal ROM. Supple. No anterior cervical adenopathy noted. Lungs: CTAB without wheezes, rales, or rhonchi. Cough is harsh and moist sounding. Patient is breathing comfortably on exam.  CV:  Regular rate and rhythm, normal heart sounds, without pathological murmurs, ectopy, gallops, or rubs. Skin:  Normal turgor. Warm, dry, without visible rash. Lymphatic: No lymphangitis or adenopathy noted. Neurological:  Oriented. Motor functions intact. Lab / Imaging Results   (All laboratory and radiology results have been personally reviewed by myself)  Labs:  No results found for this visit on 02/20/23. Imaging: All Radiology results interpreted by Radiologist unless otherwise noted. Assessment / Plan     Impression(s):  Vargas Blackman was seen today for chest congestion and nasal congestion. Diagnoses and all orders for this visit:    Sinobronchitis  -     doxycycline hyclate (VIBRA-TABS) 100 MG tablet; Take 1 tablet by mouth 2 times daily for 10 days  -     methylPREDNISolone (MEDROL DOSEPACK) 4 MG tablet; Take by mouth. Disposition:  Disposition: Discharge to home. Symptoms are most consistent with acute sinobronchitis. Scripts written for doxycycline and a Medrol Dosepak, side effects discussed. Increase fluids and rest. Symptomatic relief discussed including Tylenol prn pain/fever. Schedule f/u with PCP in 7-10 days if symptoms persist. ED sooner if symptoms worsen or change.  ED immediately with high or refractory fever, progressive SOB, dyspnea, CP, calf pain/swelling, shaking chills, vomiting, abdominal pain, lethargy, flank pain, or decreased urinary output. Pt verbalizes understanding and is in agreement with plan of care. All questions answered. Kilo Huerta PA-C    **This report was transcribed using voice recognition software. Every effort was made to ensure accuracy; however, inadvertent computerized transcription errors may be present.

## 2023-08-29 ENCOUNTER — OFFICE VISIT (OUTPATIENT)
Dept: PRIMARY CARE CLINIC | Age: 63
End: 2023-08-29
Payer: COMMERCIAL

## 2023-08-29 VITALS
TEMPERATURE: 97.4 F | BODY MASS INDEX: 25.76 KG/M2 | HEIGHT: 71 IN | WEIGHT: 184 LBS | SYSTOLIC BLOOD PRESSURE: 124 MMHG | DIASTOLIC BLOOD PRESSURE: 72 MMHG | OXYGEN SATURATION: 97 % | HEART RATE: 53 BPM

## 2023-08-29 DIAGNOSIS — Z00.00 ADULT GENERAL MEDICAL EXAM: ICD-10-CM

## 2023-08-29 DIAGNOSIS — Z00.00 ENCOUNTER FOR WELL ADULT EXAM WITHOUT ABNORMAL FINDINGS: ICD-10-CM

## 2023-08-29 DIAGNOSIS — Z00.00 ADULT GENERAL MEDICAL EXAM: Primary | ICD-10-CM

## 2023-08-29 LAB
ABSOLUTE IMMATURE GRANULOCYTE: <0.03 K/UL (ref 0–0.58)
ALBUMIN SERPL-MCNC: 4.4 G/DL (ref 3.5–5.2)
ALP BLD-CCNC: 90 U/L (ref 40–129)
ALT SERPL-CCNC: 15 U/L (ref 0–40)
ANION GAP SERPL CALCULATED.3IONS-SCNC: 15 MMOL/L (ref 7–16)
AST SERPL-CCNC: 18 U/L (ref 0–39)
BACTERIA: ABNORMAL
BASOPHILS ABSOLUTE: 0.08 K/UL (ref 0–0.2)
BASOPHILS RELATIVE PERCENT: 2 % (ref 0–2)
BILIRUB SERPL-MCNC: 0.4 MG/DL (ref 0–1.2)
BILIRUBIN DIRECT: <0.2 MG/DL (ref 0–0.3)
BILIRUBIN URINE: NEGATIVE
BILIRUBIN, INDIRECT: NORMAL MG/DL (ref 0–1)
BUN BLDV-MCNC: 14 MG/DL (ref 6–23)
CALCIUM SERPL-MCNC: 9.5 MG/DL (ref 8.6–10.2)
CHLORIDE BLD-SCNC: 100 MMOL/L (ref 98–107)
CHOLESTEROL: 194 MG/DL
CO2: 25 MMOL/L (ref 22–29)
COLOR: YELLOW
CREAT SERPL-MCNC: 0.9 MG/DL (ref 0.7–1.2)
CREATININE URINE: 89.1 MG/DL (ref 40–278)
EOSINOPHILS ABSOLUTE: 0.1 K/UL (ref 0.05–0.5)
EOSINOPHILS RELATIVE PERCENT: 2 % (ref 0–6)
GFR SERPL CREATININE-BSD FRML MDRD: >60 ML/MIN/1.73M2
GLUCOSE BLD-MCNC: 96 MG/DL (ref 74–99)
GLUCOSE URINE: NEGATIVE MG/DL
HBA1C MFR BLD: 5.9 % (ref 4–5.6)
HCT VFR BLD CALC: 45.9 % (ref 37–54)
HDLC SERPL-MCNC: 35 MG/DL
HEMOGLOBIN: 14.9 G/DL (ref 12.5–16.5)
IMMATURE GRANULOCYTES: 0 % (ref 0–5)
KETONES, URINE: NEGATIVE MG/DL
LDL CHOLESTEROL: 125 MG/DL
LEUKOCYTE ESTERASE, URINE: NEGATIVE
LYMPHOCYTES ABSOLUTE: 1.79 K/UL (ref 1.5–4)
LYMPHOCYTES RELATIVE PERCENT: 38 % (ref 20–42)
MCH RBC QN AUTO: 30.8 PG (ref 26–35)
MCHC RBC AUTO-ENTMCNC: 32.5 G/DL (ref 32–34.5)
MCV RBC AUTO: 94.8 FL (ref 80–99.9)
MICROALBUMIN/CREAT 24H UR: <12 MG/L (ref 0–19)
MICROALBUMIN/CREAT UR-RTO: NORMAL MCG/MG CREAT (ref 0–30)
MONOCYTES ABSOLUTE: 0.48 K/UL (ref 0.1–0.95)
MONOCYTES RELATIVE PERCENT: 10 % (ref 2–12)
NEUTROPHILS ABSOLUTE: 2.22 K/UL (ref 1.8–7.3)
NEUTROPHILS RELATIVE PERCENT: 47 % (ref 43–80)
NITRITE, URINE: NEGATIVE
PDW BLD-RTO: 13.2 % (ref 11.5–15)
PH UA: 6 (ref 5–9)
PLATELET # BLD: 212 K/UL (ref 130–450)
PMV BLD AUTO: 11.9 FL (ref 7–12)
POTASSIUM SERPL-SCNC: 4.4 MMOL/L (ref 3.5–5)
PROSTATE SPECIFIC ANTIGEN: 0.24 NG/ML (ref 0–4)
PROTEIN UA: NEGATIVE MG/DL
RBC # BLD: 4.84 M/UL (ref 3.8–5.8)
RBC UA: ABNORMAL /HPF
SODIUM BLD-SCNC: 140 MMOL/L (ref 132–146)
SPECIFIC GRAVITY UA: 1.02 (ref 1–1.03)
T4 FREE: 1.2 NG/DL (ref 0.9–1.7)
TOTAL PROTEIN: 7 G/DL (ref 6.4–8.3)
TRIGL SERPL-MCNC: 169 MG/DL
TSH SERPL DL<=0.05 MIU/L-ACNC: 2.03 UIU/ML (ref 0.27–4.2)
TURBIDITY: CLEAR
URIC ACID: 6 MG/DL (ref 3.4–7)
URINE HGB: NEGATIVE
UROBILINOGEN, URINE: 0.2 EU/DL (ref 0–1)
VLDLC SERPL CALC-MCNC: 34 MG/DL
WBC # BLD: 4.7 K/UL (ref 4.5–11.5)
WBC UA: ABNORMAL /HPF

## 2023-08-29 PROCEDURE — 99396 PREV VISIT EST AGE 40-64: CPT | Performed by: FAMILY MEDICINE

## 2023-08-29 ASSESSMENT — ENCOUNTER SYMPTOMS
CONSTIPATION: 0
PHOTOPHOBIA: 0
COUGH: 0
SORE THROAT: 0
ABDOMINAL PAIN: 0
BLOOD IN STOOL: 0
NAUSEA: 0
SHORTNESS OF BREATH: 0
VOMITING: 0
DIARRHEA: 0
BACK PAIN: 0

## 2023-08-29 ASSESSMENT — PATIENT HEALTH QUESTIONNAIRE - PHQ9
SUM OF ALL RESPONSES TO PHQ QUESTIONS 1-9: 0
1. LITTLE INTEREST OR PLEASURE IN DOING THINGS: 0
SUM OF ALL RESPONSES TO PHQ QUESTIONS 1-9: 0
2. FEELING DOWN, DEPRESSED OR HOPELESS: 0
SUM OF ALL RESPONSES TO PHQ QUESTIONS 1-9: 0
SUM OF ALL RESPONSES TO PHQ9 QUESTIONS 1 & 2: 0
SUM OF ALL RESPONSES TO PHQ QUESTIONS 1-9: 0

## 2023-08-29 NOTE — PROGRESS NOTES
Well Adult Note  Name: Teto Brown Date: 2023   MRN: [id number] Sex: Male   Age: 58 y.o. Ethnicity: Non- / Non    : 1960 Race: White (non-)      Carmen Sharma is here for well adult exam.  History:  Patient presents today for annual physical exam.  Patient states overall that he has been doing well since last office visit. Denies any current issues or concerns. No changes in health since last office visit. Previous labs reviewed. Does need updated. No colonoscopy until . Previous varicose vein therapy with no recurrence. Review of Systems   Constitutional:  Negative for chills and fever. HENT:  Negative for congestion, hearing loss, nosebleeds and sore throat. Eyes:  Negative for photophobia. Respiratory:  Negative for cough and shortness of breath. Cardiovascular:  Negative for chest pain, palpitations and leg swelling. Gastrointestinal:  Negative for abdominal pain, blood in stool, constipation, diarrhea, nausea and vomiting. Endocrine: Negative for polydipsia. Genitourinary:  Negative for dysuria, frequency, hematuria and urgency. Musculoskeletal:  Negative for back pain and myalgias. Skin: Negative. Neurological:  Negative for dizziness, tremors, weakness and headaches. Hematological:  Does not bruise/bleed easily. Psychiatric/Behavioral:  Negative for hallucinations and suicidal ideas. All other systems reviewed and are negative. No Known Allergies      Prior to Visit Medications    Medication Sig Taking?  Authorizing Provider   Vitamin D (CHOLECALCIFEROL) 25 MCG (1000 UT) TABS tablet Take by mouth Yes Historical Provider, MD   magnesium (MAGNESIUM-OXIDE) 250 MG TABS tablet Take 1 tablet by mouth daily Yes Historical Provider, MD   Multiple Vitamins-Minerals (PRESERVISION AREDS PO) Take by mouth Yes Historical Provider, MD   Specialty Vitamins Products (COLLAGEN ULTRA) CAPS Take by mouth Yes Historical Provider, MD

## 2023-08-30 LAB — HEPATITIS C ANTIBODY: NONREACTIVE

## 2024-04-09 ENCOUNTER — OFFICE VISIT (OUTPATIENT)
Dept: FAMILY MEDICINE CLINIC | Age: 64
End: 2024-04-09
Payer: COMMERCIAL

## 2024-04-09 VITALS
RESPIRATION RATE: 18 BRPM | HEIGHT: 71 IN | HEART RATE: 72 BPM | SYSTOLIC BLOOD PRESSURE: 122 MMHG | DIASTOLIC BLOOD PRESSURE: 80 MMHG | TEMPERATURE: 98.2 F | OXYGEN SATURATION: 98 % | WEIGHT: 188 LBS | BODY MASS INDEX: 26.32 KG/M2

## 2024-04-09 DIAGNOSIS — R05.1 ACUTE COUGH: ICD-10-CM

## 2024-04-09 DIAGNOSIS — J40 BRONCHITIS: Primary | ICD-10-CM

## 2024-04-09 PROCEDURE — 99213 OFFICE O/P EST LOW 20 MIN: CPT | Performed by: NURSE PRACTITIONER

## 2024-04-09 RX ORDER — AZITHROMYCIN 250 MG/1
TABLET, FILM COATED ORAL
Qty: 6 TABLET | Refills: 0 | Status: SHIPPED | OUTPATIENT
Start: 2024-04-09

## 2024-04-09 NOTE — PROGRESS NOTES
24  Silviano De La Cruz : 1960 Sex: male  Age 63 y.o.    Subjective:  Chief Complaint   Patient presents with    Congestion       HPI:   Silviano De La Cruz , 63 y.o. male presents to the clinic for evaluation of cough / chest congestion x 3-4 days. The patient also reports mild sinus drainage. The patient has taken Robitussin DM for symptoms. The patient reports unchanged symptoms over time. The patient denies known ill exposure. The patient denies headache, sore throat, rash, and fever. The patient also denies chest pain, abdominal pain, shortness of breath, wheezing, and nausea / vomiting / diarrhea.    ROS:   Unless otherwise stated in this report the patient's positive and negative responses for review of systems for constitutional, eyes, ENT, cardiovascular, respiratory, gastrointestinal, neurological, , musculoskeletal, and integument systems and related systems to the presenting problem are either stated in the history of present illness or were not pertinent or were negative for the symptoms and/or complaints related to the presenting medical problem.  Positives and pertinent negatives as per HPI.  All others reviewed and are negative.      PMH:     Past Medical History:   Diagnosis Date    Colon polyps     COVID-19 2020    Diverticulosis     Hyperlipidemia        Past Surgical History:   Procedure Laterality Date    COLONOSCOPY      3 years ago    HERNIA REPAIR Right 2021    HERNIA INGUINAL REPAIR LAPAROSCOPIC ROBOTIC XI WITH MESH performed by Haris Chung MD at [facility] OR       Family History   Problem Relation Age of Onset    Diabetes Mother     Prostate Cancer Father     High Blood Pressure Father     Alzheimer's Disease Sister     Colon Cancer Brother        Medications:     Current Outpatient Medications:     azithromycin (ZITHROMAX Z-CRUZITO) 250 MG tablet, Take 2 tabs on day one, then 1 tab daily for the next 4 days, Disp: 6 tablet, Rfl: 0    Vitamin D (CHOLECALCIFEROL) 25 MCG

## (undated) DEVICE — GLOVE SURG SZ 7 L12IN FNGR THK94MIL TRNSLUC YEL LTX HYDRGEL

## (undated) DEVICE — ELECTRODE PT RET AD L9FT HI MOIST COND ADH HYDRGEL CORDED

## (undated) DEVICE — NEEDLE HYPO 25GA L1.5IN BLU POLYPR HUB S STL REG BVL STR

## (undated) DEVICE — SOLUTION IV IRRIG POUR BRL 0.9% SODIUM CHL 2F7124

## (undated) DEVICE — INTENDED FOR TISSUE SEPARATION, AND OTHER PROCEDURES THAT REQUIRE A SHARP SURGICAL BLADE TO PUNCTURE OR CUT.: Brand: BARD-PARKER ® STAINLESS STEEL BLADES

## (undated) DEVICE — WARMER SCP 2 ANTIFOG LAP DISP

## (undated) DEVICE — INSUFFLATION TUBING SET WITH FILTER, FUNNEL CONNECTOR AND LUER LOCK: Brand: JOSNOE MEDICAL INC

## (undated) DEVICE — TRAY 0 DEG STRYKER 5MM/10MM LENS REUSABLE

## (undated) DEVICE — CHLORAPREP 26ML ORANGE

## (undated) DEVICE — KIT,ANTI FOG,W/SPONGE & FLUID,SOFT PACK: Brand: MEDLINE

## (undated) DEVICE — CANNULA SEAL

## (undated) DEVICE — PACK PROCEDURE SURG GEN CUST

## (undated) DEVICE — COLUMN DRAPE

## (undated) DEVICE — SYRINGE 20ML LL S/C 50

## (undated) DEVICE — SUTURE V-LOC 180 SZ 2-0 L9IN ABSRB GRN L27MM GS-22 1/2 CIR VLOCL2145

## (undated) DEVICE — GOWN,SIRUS,FABRNF,XL,20/CS: Brand: MEDLINE

## (undated) DEVICE — INSUFFLATION NEEDLE TO ESTABLISH PNEUMOPERITONEUM.: Brand: INSUFFLATION NEEDLE

## (undated) DEVICE — [HIGH FLOW INSUFFLATOR,  DO NOT USE IF PACKAGE IS DAMAGED,  KEEP DRY,  KEEP AWAY FROM SUNLIGHT,  PROTECT FROM HEAT AND RADIOACTIVE SOURCES.]: Brand: PNEUMOSURE

## (undated) DEVICE — CAMERA STRYKER 1488

## (undated) DEVICE — ADHESIVE SKIN CLSR 0.7ML TOP DERMBND ADV

## (undated) DEVICE — BLADELESS OBTURATOR: Brand: WECK VISTA

## (undated) DEVICE — DRAPE,LAP,CHOLE,W/TROUGHS,STERILE: Brand: MEDLINE

## (undated) DEVICE — SCISSORS SURG DIA8MM MPLR CRV ENDOWRIST

## (undated) DEVICE — TOTAL TRAY, DB, 100% SILI FOLEY, 16FR 10: Brand: MEDLINE

## (undated) DEVICE — DOUBLE BASIN SET: Brand: MEDLINE INDUSTRIES, INC.

## (undated) DEVICE — TIP COVER ACCESSORY

## (undated) DEVICE — FORCE BIPOLAR: Brand: ENDOWRIST

## (undated) DEVICE — MEGA SUTURECUT ND: Brand: ENDOWRIST

## (undated) DEVICE — INSTRUMENT CLAMP TOWEL LARGE REUSABLE

## (undated) DEVICE — TOWEL,OR,DSP,ST,BLUE,STD,6/PK,12PK/CS: Brand: MEDLINE

## (undated) DEVICE — ARM DRAPE